# Patient Record
Sex: FEMALE | Race: WHITE | NOT HISPANIC OR LATINO | Employment: UNEMPLOYED | URBAN - METROPOLITAN AREA
[De-identification: names, ages, dates, MRNs, and addresses within clinical notes are randomized per-mention and may not be internally consistent; named-entity substitution may affect disease eponyms.]

---

## 2017-04-03 ENCOUNTER — APPOINTMENT (OUTPATIENT)
Dept: WOMENS IMAGING | Facility: HOSPITAL | Age: 42
End: 2017-04-03

## 2017-04-03 PROCEDURE — 77067 SCR MAMMO BI INCL CAD: CPT | Performed by: RADIOLOGY

## 2018-02-20 ENCOUNTER — OFFICE VISIT (OUTPATIENT)
Dept: GASTROENTEROLOGY | Facility: CLINIC | Age: 43
End: 2018-02-20

## 2018-02-20 ENCOUNTER — TELEPHONE (OUTPATIENT)
Dept: GASTROENTEROLOGY | Facility: CLINIC | Age: 43
End: 2018-02-20

## 2018-02-20 VITALS
TEMPERATURE: 98.6 F | SYSTOLIC BLOOD PRESSURE: 126 MMHG | HEIGHT: 65 IN | DIASTOLIC BLOOD PRESSURE: 72 MMHG | WEIGHT: 127 LBS | BODY MASS INDEX: 21.16 KG/M2

## 2018-02-20 DIAGNOSIS — Z83.71 FH: COLON POLYPS: ICD-10-CM

## 2018-02-20 DIAGNOSIS — R10.13 EPIGASTRIC PAIN: Primary | ICD-10-CM

## 2018-02-20 PROCEDURE — 99204 OFFICE O/P NEW MOD 45 MIN: CPT | Performed by: INTERNAL MEDICINE

## 2018-02-20 RX ORDER — NICOTINE POLACRILEX 2 MG
GUM BUCCAL
COMMUNITY
End: 2022-03-04

## 2018-02-20 RX ORDER — SUCRALFATE ORAL 1 G/10ML
1 SUSPENSION ORAL
Qty: 900 ML | Refills: 1 | Status: SHIPPED | OUTPATIENT
Start: 2018-02-20 | End: 2018-09-20 | Stop reason: ALTCHOICE

## 2018-02-20 NOTE — TELEPHONE ENCOUNTER
----- Message from China Jerome MD sent at 2/20/2018  3:21 PM EST -----  Regarding: REQUEST LABS  REQUEST LABS FROM DR OMKAR URBANO

## 2018-02-20 NOTE — PROGRESS NOTES
Chief Complaint   Patient presents with   • Abdominal Pain     loose stool   • esophageal discomfort     after eatting     Flora Cruz is a 42 y.o. female who presents with epigastric pain that began one year go.  Symptoms are intermittent.  She has seen a complementary physician and has been on a gluten free and dairy free diet 2-3 months.  She has been to the ER 3 times  One time she had developing pneumonia.  Pain hurts after she eats after she lays down at night - wakes her from sleep.  Worse if she lays onher stomach - worse mid cycle.  Type of food she eats doesn't matter.  Worse if she eats out.  ? Whether wine makes it worse.  raRely nauseated.  Lost a little weight since she started diet.  She feels numbness in her upper extremities at times and she has seen 2 neurologists. She feels fatigued.    Had ruq u/s at [facility] - has not gotten results,She has consistently loose stools for the most part - no blood in stool.  Occ constipated.  Her brother (2 years older) has had polyps - GP with CRC, mother with polyps.  HPI  History reviewed. No pertinent past medical history.  Past Surgical History:   Procedure Laterality Date   • LEEP  2008   • WISDOM TOOTH EXTRACTION         Current Outpatient Prescriptions:   •  Biotin 1 MG capsule, Take  by mouth., Disp: , Rfl:   •  Multiple Vitamins-Minerals (MULTIVITAMIN ADULT PO), Take  by mouth., Disp: , Rfl:   •  Omega-3 Fatty Acids (OMEGA 3 PO), Take  by mouth., Disp: , Rfl:   •  sucralfate (CARAFATE) 1 GM/10ML suspension, Take 10 mL by mouth 4 (Four) Times a Day Before Meals & at Bedtime As Needed (epigastric pain)., Disp: 900 mL, Rfl: 1  No Known Allergies  Social History     Social History   • Marital status: Unknown     Spouse name: N/A   • Number of children: N/A   • Years of education: N/A     Occupational History   • Not on file.     Social History Main Topics   • Smoking status: Never Smoker   • Smokeless tobacco: Never Used   • Alcohol use 0.6 oz/week     1  Glasses of wine per week      Comment: social   • Drug use: No   • Sexual activity: Not on file     Other Topics Concern   • Not on file     Social History Narrative   • No narrative on file     Family History   Problem Relation Age of Onset   • Diverticulitis Father    • Celiac disease Sister    • Crohn's disease Sister    • Colon cancer Maternal Grandmother    • Colon cancer Paternal Grandmother      Review of Systems   Constitutional: Positive for fatigue. Negative for appetite change and unexpected weight change.   Gastrointestinal: Positive for abdominal pain. Negative for abdominal distention, blood in stool, nausea and vomiting.   Neurological: Positive for numbness.   All other systems reviewed and are negative.    Vitals:    02/20/18 1459   BP: 126/72   Temp: 98.6 °F (37 °C)     Last Weight    02/20/18  1459   Weight: 57.6 kg (127 lb)     Physical Exam   Constitutional: She is oriented to person, place, and time. She appears well-developed and well-nourished.   HENT:   Head: Normocephalic and atraumatic.   Eyes: Conjunctivae are normal. No scleral icterus.   Neck: Normal range of motion. Neck supple.   Cardiovascular: Normal rate and regular rhythm.    Pulmonary/Chest: Effort normal and breath sounds normal.   Abdominal: Soft. Bowel sounds are normal. She exhibits no distension. There is tenderness.       Musculoskeletal: She exhibits no edema.   Neurological: She is alert and oriented to person, place, and time.   Skin: Skin is warm and dry.   Psychiatric: She has a normal mood and affect.   Nursing note and vitals reviewed.    No images are attached to the encounter.  No notes on file  Flora was seen today for abdominal pain and esophageal discomfort.    Diagnoses and all orders for this visit:    Epigastric pain  -     Case Request; Standing  -     Case Request    FH: colon polyps  -     Case Request; Standing  -     Case Request    Other orders  -     sucralfate (CARAFATE) 1 GM/10ML suspension; Take 10  mL by mouth 4 (Four) Times a Day Before Meals & at Bedtime As Needed (epigastric pain).  -     Implement Anesthesia orders day of procedure.; Standing  -     Obtain informed consent; Standing  -     Verify bowel prep was successful; Standing  -     Give tap water enema if bowel prep was insufficient; Standing    Plan:  - request labs and u/s  - trial carafate  - schedule egd and colonoscopy for further evaluation

## 2018-02-20 NOTE — TELEPHONE ENCOUNTER
Call to Dr Bradley Soto's office @ [phone] and spoke with Hien.  Request recent labs be faxed to [phone].      Call to [facility] @ [phone] and spoke with Darling.  Request US results be faxed to [phone].     Call to Dr Ana Brown's office @ [phone].  VM left for Isabell requesting recent labs be faxed to [phone].

## 2018-02-20 NOTE — TELEPHONE ENCOUNTER
----- Message from China Jerome MD sent at 2/20/2018  3:19 PM EST -----  Regarding: request u/s and labs  U/s from [facility] from dr zimmerman

## 2018-02-26 ENCOUNTER — TELEPHONE (OUTPATIENT)
Dept: GASTROENTEROLOGY | Facility: CLINIC | Age: 43
End: 2018-02-26

## 2018-02-26 NOTE — TELEPHONE ENCOUNTER
"Call to pt.  States forgot to tell Dr Jerome that has \"pretty severe\" diastasis recti.  Asking if this would have any impact on EGD.    Advise pt Dr Jerome out of office until tomorrow - will send message.  Pt verb understanding.  "

## 2018-02-26 NOTE — TELEPHONE ENCOUNTER
----- Message from Guy Montoya sent at 2/26/2018  9:46 AM EST -----  Regarding: MEDICAL QUESTIONS   Contact: [phone]  PT CALLED WITH MEDICAL QUESTIONS DUE TO BEEN IN THE OFFICE LAST WEEKS

## 2018-09-14 NOTE — PROGRESS NOTES
Date of Office Visit: 2018  Encounter Provider: John Collins MD  Place of Service: [facility] CARD[facility]  Patient Name: Flora Cruz  :1975    Chief Complaint   Patient presents with   • Palpitations   • Loss of Consciousness   :     HPI: Flora Cruz is a 42 y.o. female who presents today in consultation for palpitations at the request of Dr. Soto.  She has had a tough go of it over the last 1.5 years.  She had fatigue, dyspnea, and chest pain and was ultimately diagnosed with pneumonia. She was treated with antibiotics but never really felt better.  She did have a stress echo at [facility] which was good quality and completely normal.  She also wore a Holter which showed a total of 19 ectopics.      She continues to have a lot of issues, and has been diagnosed with mold toxicity and systemic inflammation.  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  She has had brain MRIs and a battery of lab tests.      She has had palpitations since she was quite young.  They occur more at rest and are more noticeable on the left side.  It feels like a skip or flutter or hard beat.  It doesn't race or beat irregularly.  It has improved since starting a multivitamin.  She has not passed out.  She hasn't had exertional chest pain either but does sometimes have mid/lower sternal pain.    Past Medical History:   Diagnosis Date   • History of panic attacks        Past Surgical History:   Procedure Laterality Date   • LEEKEESHA     • WISDOM TOOTH EXTRACTION         Social History     Social History   • Marital status:      Spouse name: N/A   • Number of children: N/A   • Years of education: N/A     Occupational History   • Not on file.     Social History Main Topics   • Smoking status: Never Smoker   • Smokeless tobacco: Never Used   • Alcohol use 0.6 oz/week     1 Glasses of wine per week      Comment: social   • Drug use: No   • Sexual activity: Yes     Other Topics  "Concern   • Not on file     Social History Narrative   • No narrative on file       Family History   Problem Relation Age of Onset   • Diverticulitis Father    • Celiac disease Sister    • Crohn's disease Sister    • Colon cancer Maternal Grandmother    • Colon cancer Paternal Grandmother    • Heart disease Mother        Review of Systems   Constitution: Positive for malaise/fatigue.   Cardiovascular: Positive for dyspnea on exertion.   Endocrine: Positive for cold intolerance.   Gastrointestinal: Positive for abdominal pain and change in bowel habit.   Neurological: Positive for excessive daytime sleepiness, headaches, numbness and paresthesias.   Psychiatric/Behavioral: The patient is nervous/anxious.    All other systems reviewed and are negative.      No Known Allergies      Current Outpatient Prescriptions:   •  Alpha-Lipoic Acid 100 MG capsule, Take  by mouth 2 (Two) Times a Day., Disp: , Rfl:   •  Biotin 1 MG capsule, Take  by mouth., Disp: , Rfl:   •  Multiple Vitamins-Minerals (MULTIVITAMIN ADULT PO), Take  by mouth., Disp: , Rfl:   •  Omega-3 Fatty Acids (OMEGA 3 PO), Take  by mouth., Disp: , Rfl:   •  vinCRIStine liposome (MARQIBO) 5 MG/31ML chemo injection, Infuse  into a venous catheter., Disp: , Rfl:       Objective:     Vitals:    09/20/18 1131 09/20/18 1141   BP: 118/76 116/74   BP Location: Left arm Right arm   Patient Position: Sitting Sitting   Pulse: 72    Weight: 51.3 kg (113 lb)    Height: 165.1 cm (65\")      Body mass index is 18.8 kg/m².    Physical Exam   Constitutional: She is oriented to person, place, and time. She appears well-developed and well-nourished.   HENT:   Head: Normocephalic.   Nose: Nose normal.   Mouth/Throat: Oropharynx is clear and moist.   Eyes: Pupils are equal, round, and reactive to light. Conjunctivae and EOM are normal.   Neck: Normal range of motion. No JVD present.   Cardiovascular: Normal rate, regular rhythm, normal heart sounds and intact distal pulses.    No " murmur heard.  Pulmonary/Chest: Effort normal and breath sounds normal.   Abdominal: Soft. She exhibits no mass. There is no tenderness.   Musculoskeletal: Normal range of motion. She exhibits no edema.   Lymphadenopathy:     She has no cervical adenopathy.   Neurological: She is alert and oriented to person, place, and time. No cranial nerve deficit.   Skin: Skin is warm and dry. No rash noted.   Psychiatric: She has a normal mood and affect. Her behavior is normal. Judgment and thought content normal.   Vitals reviewed.        ECG 12 Lead  Date/Time: 9/20/2018 4:01 PM  Performed by: JOHN COLLINS  Authorized by: JOHN COLLINS   Comparison: compared with previous ECG   Similar to previous ECG  Rhythm: sinus rhythm  Conduction: conduction normal  ST Segments: ST segments normal  T Waves: T waves normal  QRS axis: normal  Other: no other findings  Clinical impression: normal ECG              Assessment:       Diagnosis Plan   1. Palpitations     2. Precordial pain            Plan:       1.  These sound like benign ectopics, and a previous Holter showed rare PVCs/PACs.  Her heart is structurally normal.  I recommend observation for these.  I did tell her that empirically, OTC magnesium supplementation does help some people, and it seems that her MVI is improving things already.    2.  I suspect that this is costochondritis.  She had a good quality BEBO last year which was normal.     Sincerely,       John Collins MD

## 2018-09-18 ENCOUNTER — TELEPHONE (OUTPATIENT)
Dept: CARDIOLOGY | Facility: CLINIC | Age: 43
End: 2018-09-18

## 2018-09-18 NOTE — TELEPHONE ENCOUNTER
Pt returned your call and said that she has seen a cardiologist, Dr. Wang from [facility] and he did do some tests on her. Looks like there are some in Care Everywhere. Her c/b is [phone].

## 2018-09-20 ENCOUNTER — OFFICE VISIT (OUTPATIENT)
Dept: CARDIOLOGY | Facility: CLINIC | Age: 43
End: 2018-09-20

## 2018-09-20 VITALS
WEIGHT: 113 LBS | HEIGHT: 65 IN | SYSTOLIC BLOOD PRESSURE: 116 MMHG | DIASTOLIC BLOOD PRESSURE: 74 MMHG | HEART RATE: 72 BPM | BODY MASS INDEX: 18.83 KG/M2

## 2018-09-20 DIAGNOSIS — R00.2 PALPITATIONS: Primary | ICD-10-CM

## 2018-09-20 DIAGNOSIS — R07.2 PRECORDIAL PAIN: ICD-10-CM

## 2018-09-20 PROCEDURE — 93000 ELECTROCARDIOGRAM COMPLETE: CPT | Performed by: INTERNAL MEDICINE

## 2018-09-20 PROCEDURE — 99244 OFF/OP CNSLTJ NEW/EST MOD 40: CPT | Performed by: INTERNAL MEDICINE

## 2018-11-13 ENCOUNTER — TELEPHONE (OUTPATIENT)
Dept: GASTROENTEROLOGY | Facility: CLINIC | Age: 43
End: 2018-11-13

## 2018-11-13 NOTE — TELEPHONE ENCOUNTER
Called pt and pt reports that she is ready to have egd and c/s.  She states she is having abd pain and breathing issues.  She states the allergist Dr Ibarra states her lungs were fine but he thought she needed to go ahead with scopes.  Pt reports that her diastasis recti is worse. Advised would send message to Dr Jerome. Pt verb understanding.

## 2018-11-13 NOTE — TELEPHONE ENCOUNTER
----- Message from Guy Montoya sent at 11/13/2018 11:33 AM EST -----  Regarding: c/s case order   Contact: [phone]  Need new case order

## 2018-11-14 ENCOUNTER — PREP FOR SURGERY (OUTPATIENT)
Dept: OTHER | Facility: HOSPITAL | Age: 43
End: 2018-11-14

## 2018-11-14 DIAGNOSIS — Z83.71 FH: COLON POLYPS: ICD-10-CM

## 2018-11-14 DIAGNOSIS — R10.13 EPIGASTRIC PAIN: Primary | ICD-10-CM

## 2018-11-20 ENCOUNTER — TELEPHONE (OUTPATIENT)
Dept: URGENT CARE | Facility: CLINIC | Age: 43
End: 2018-11-20

## 2019-01-02 ENCOUNTER — OFFICE VISIT (OUTPATIENT)
Dept: GASTROENTEROLOGY | Facility: CLINIC | Age: 44
End: 2019-01-02

## 2019-01-02 ENCOUNTER — TELEPHONE (OUTPATIENT)
Dept: GASTROENTEROLOGY | Facility: CLINIC | Age: 44
End: 2019-01-02

## 2019-01-02 VITALS
BODY MASS INDEX: 20.12 KG/M2 | DIASTOLIC BLOOD PRESSURE: 62 MMHG | WEIGHT: 120.8 LBS | SYSTOLIC BLOOD PRESSURE: 108 MMHG | HEIGHT: 65 IN | TEMPERATURE: 97.7 F

## 2019-01-02 DIAGNOSIS — M62.08 DIASTASIS OF RECTUS ABDOMINIS: ICD-10-CM

## 2019-01-02 DIAGNOSIS — R10.13 DYSPEPSIA: Primary | ICD-10-CM

## 2019-01-02 DIAGNOSIS — K42.9 UMBILICAL HERNIA WITHOUT OBSTRUCTION AND WITHOUT GANGRENE: ICD-10-CM

## 2019-01-02 DIAGNOSIS — R14.0 ABDOMINAL DISTENSION: ICD-10-CM

## 2019-01-02 PROCEDURE — 99214 OFFICE O/P EST MOD 30 MIN: CPT | Performed by: INTERNAL MEDICINE

## 2019-01-02 NOTE — TELEPHONE ENCOUNTER
request path from [facility] from recent egd/c/s   Received: Today   Message Contents   China Jerome MD  [facility] Clinical 1 Pool     Called [facility] at [phone] attemped x2.  Advised  not available and call dropped. Will attempt later.

## 2019-01-02 NOTE — PROGRESS NOTES
Chief Complaint   Patient presents with   • upper gastric pain and pressure       Flora Cruz is a  43 y.o. female here for a follow up visit for abdominal pain and bloating.  She reports a good source of the day goes on.  Since her last visit she underwent an EGD and colonoscopy with another provider.  She did have 3 polyps removed-1 was a tubulovillous adenoma.  She had intraepithelial lymphocytosis in the small intestine.  This is per the patient.    Unavailable.  She was recommended to try a PPI which she has not done.  HIDA scan was recommended but they did not have CCK so was not performed.  She has resumed eating dairy and gluten.  She lost significant weight on this diet.  She did have a negative celiac blood test while she was eating gluten.  She is feeling better overall but notes that her abdomen gets more distended to sedate goes on.  She sees a physical protuberance in her upper abdomen near her belly button which is painful.  Bowel movements are normal.  No blood in her stool.  HPI  Past Medical History:   Diagnosis Date   • History of panic attacks      Past Surgical History:   Procedure Laterality Date   • CERVICAL BIOPSY  W/ LOOP ELECTRODE EXCISION     • COLONOSCOPY  11/27/2018    S[facility]ale M.D.   • LEEP  2008   • UPPER GASTROINTESTINAL ENDOSCOPY  11/27/2018    possible celiac - [facility]    • WISDOM TOOTH EXTRACTION         Current Outpatient Medications:   •  Multiple Vitamins-Minerals (MULTIVITAMIN ADULT PO), Take  by mouth., Disp: , Rfl:   •  Omega-3 Fatty Acids (OMEGA 3 PO), Take  by mouth., Disp: , Rfl:   •  Probiotic Product (PROBIOTIC PO), Take  by mouth., Disp: , Rfl:   •  Alpha-Lipoic Acid 100 MG capsule, Take  by mouth 2 (Two) Times a Day., Disp: , Rfl:   •  Biotin 1 MG capsule, Take  by mouth., Disp: , Rfl:   •  vinCRIStine liposome (MARQIBO) 5 MG/31ML chemo injection, Infuse  into a venous catheter., Disp: , Rfl:   PRN Meds:.  No Known Allergies  Social History      Socioeconomic History   • Marital status:      Spouse name: Not on file   • Number of children: Not on file   • Years of education: Not on file   • Highest education level: Not on file   Social Needs   • Financial resource strain: Not on file   • Food insecurity - worry: Not on file   • Food insecurity - inability: Not on file   • Transportation needs - medical: Not on file   • Transportation needs - non-medical: Not on file   Occupational History   • Not on file   Tobacco Use   • Smoking status: Never Smoker   • Smokeless tobacco: Never Used   Substance and Sexual Activity   • Alcohol use: Yes     Alcohol/week: 0.6 oz     Types: 1 Glasses of wine per week     Comment: social   • Drug use: No   • Sexual activity: Yes   Other Topics Concern   • Not on file   Social History Narrative   • Not on file     Family History   Problem Relation Age of Onset   • Diverticulitis Father    • Colon polyps Father    • Celiac disease Sister    • Crohn's disease Sister    • Colon polyps Sister    • Colon cancer Maternal Grandmother    • Colon cancer Paternal Grandmother    • Cirrhosis Paternal Grandmother    • Heart disease Mother    • Colon polyps Mother    • Colon polyps Brother      Review of Systems   Constitutional: Negative for appetite change and unexpected weight change.   Gastrointestinal: Positive for abdominal distention and abdominal pain. Negative for blood in stool, nausea and vomiting.   All other systems reviewed and are negative.    Vitals:    01/02/19 1415   BP: 108/62   Temp: 97.7 °F (36.5 °C)         01/02/19  1415   Weight: 54.8 kg (120 lb 12.8 oz)     Physical Exam   Constitutional: She appears well-developed and well-nourished.   HENT:   Head: Normocephalic and atraumatic.   Eyes: No scleral icterus.   Pulmonary/Chest: Effort normal.   Abdominal: Soft. She exhibits no distension and no mass. There is no tenderness.   Small umbilical hernia, diastasis rectus   Neurological: She is alert.   Skin: Skin  is warm and dry.   Psychiatric: She has a normal mood and affect.     No images are attached to the encounter.  Diagnoses and all orders for this visit:    Dyspepsia    Umbilical hernia without obstruction and without gangrene    Abdominal distension    Diastasis of rectus abdominis    Other orders  -     Probiotic Product (PROBIOTIC PO); Take  by mouth.         Plan:  - trial ppi  - check for sibo  - try FDgard/IBgard  - Request path from [facility]  - 8 week f/u

## 2019-01-04 NOTE — TELEPHONE ENCOUNTER
Called [facility] at [phone] and was on hold for 10 min.  Will try again later.

## 2019-01-04 NOTE — TELEPHONE ENCOUNTER
Called [facility] medical records at [phone] and spoke with Monse and requested pt's recent egd and c/s and path report be faxed to [phone].

## 2019-01-07 NOTE — TELEPHONE ENCOUNTER
Records received from [facility] and scanned under media tab.  Dr Jerome notified.

## 2019-01-25 ENCOUNTER — TELEPHONE (OUTPATIENT)
Dept: CARDIOLOGY | Facility: CLINIC | Age: 44
End: 2019-01-25

## 2019-01-25 NOTE — TELEPHONE ENCOUNTER
01/25/19  4:28 PM  Flora Cruz  1975    Home Phone [phone]   Mobile [phone]       Flora Cruz is a patient of Dr fallon.  She is calling in with c/o constant chest tenderness for the last 4-5 days.  She says it is tender to touch.  When she lays down she has left arm numbness from the shoulder down.  She describes a burning in the chest.  She has not tried an antiacid, as GI had asked her to do.  She says she is chilling as well, but not running a temp.      She has no other symptoms.  I suggested she call her PCP. She doesn't have a PCP, gave her the number of appt liaison.  She is agreeable to call.    Patient instructed to go to er if symptoms worsen.    Does she need to come in and be seen?    Belia Diaz RN  Triage nurse

## 2019-01-28 NOTE — TELEPHONE ENCOUNTER
Just based on your description, this sounds non-cardiac.  Please check on her today and if she still has symptoms she should go to  to exclude influenza.    abiola

## 2019-01-28 NOTE — TELEPHONE ENCOUNTER
Patient notified. Still having numbness.  Suggested she go to urgent care.  She is agreeable.  She is still trying to find a PCP  Belia Diaz RN  Triage nurse

## 2020-12-03 ENCOUNTER — TRANSCRIBE ORDERS (OUTPATIENT)
Dept: NEUROLOGY | Facility: CLINIC | Age: 45
End: 2020-12-03

## 2020-12-03 DIAGNOSIS — G47.10 HYPERSOMNIA: Primary | ICD-10-CM

## 2020-12-03 DIAGNOSIS — R29.818 SUSPECTED SLEEP APNEA: ICD-10-CM

## 2020-12-03 DIAGNOSIS — R06.83 SNORING: ICD-10-CM

## 2020-12-03 DIAGNOSIS — G47.00 INSOMNIA, UNSPECIFIED TYPE: ICD-10-CM

## 2020-12-13 ENCOUNTER — APPOINTMENT (OUTPATIENT)
Dept: SLEEP MEDICINE | Facility: HOSPITAL | Age: 45
End: 2020-12-13

## 2021-05-04 ENCOUNTER — APPOINTMENT (OUTPATIENT)
Dept: WOMENS IMAGING | Facility: HOSPITAL | Age: 46
End: 2021-05-04

## 2021-05-04 PROCEDURE — 77063 BREAST TOMOSYNTHESIS BI: CPT | Performed by: RADIOLOGY

## 2021-05-04 PROCEDURE — 77067 SCR MAMMO BI INCL CAD: CPT | Performed by: RADIOLOGY

## 2022-01-05 ENCOUNTER — PREP FOR SURGERY (OUTPATIENT)
Dept: SURGERY | Facility: SURGERY CENTER | Age: 47
End: 2022-01-05

## 2022-01-05 ENCOUNTER — OFFICE VISIT (OUTPATIENT)
Dept: GASTROENTEROLOGY | Facility: CLINIC | Age: 47
End: 2022-01-05

## 2022-01-05 VITALS
TEMPERATURE: 97.5 F | HEIGHT: 65 IN | DIASTOLIC BLOOD PRESSURE: 70 MMHG | OXYGEN SATURATION: 99 % | BODY MASS INDEX: 23.82 KG/M2 | HEART RATE: 81 BPM | WEIGHT: 143 LBS | SYSTOLIC BLOOD PRESSURE: 114 MMHG

## 2022-01-05 DIAGNOSIS — Z86.010 HISTORY OF COLON POLYPS: Primary | ICD-10-CM

## 2022-01-05 DIAGNOSIS — Z12.11 ENCOUNTER FOR SCREENING FOR MALIGNANT NEOPLASM OF COLON: ICD-10-CM

## 2022-01-05 DIAGNOSIS — K43.9 VENTRAL HERNIA WITHOUT OBSTRUCTION OR GANGRENE: ICD-10-CM

## 2022-01-05 PROBLEM — Z86.0100 HISTORY OF COLON POLYPS: Status: ACTIVE | Noted: 2022-01-05

## 2022-01-05 PROCEDURE — 99204 OFFICE O/P NEW MOD 45 MIN: CPT | Performed by: NURSE PRACTITIONER

## 2022-01-05 RX ORDER — SODIUM CHLORIDE, SODIUM LACTATE, POTASSIUM CHLORIDE, CALCIUM CHLORIDE 600; 310; 30; 20 MG/100ML; MG/100ML; MG/100ML; MG/100ML
30 INJECTION, SOLUTION INTRAVENOUS CONTINUOUS PRN
Status: CANCELLED | OUTPATIENT
Start: 2022-01-05

## 2022-01-05 RX ORDER — FLUOXETINE 10 MG/1
30 CAPSULE ORAL DAILY
COMMUNITY
Start: 2021-10-26

## 2022-01-05 RX ORDER — SODIUM CHLORIDE 0.9 % (FLUSH) 0.9 %
10 SYRINGE (ML) INJECTION AS NEEDED
Status: CANCELLED | OUTPATIENT
Start: 2022-01-05

## 2022-01-05 RX ORDER — SODIUM CHLORIDE 0.9 % (FLUSH) 0.9 %
3 SYRINGE (ML) INJECTION EVERY 12 HOURS SCHEDULED
Status: CANCELLED | OUTPATIENT
Start: 2022-01-05

## 2022-03-04 NOTE — SIGNIFICANT NOTE
Education provided the Patient on the following:    - Nothing to Eat or Drink after MN the night before the procedure    - Avoid red/purple fluids while completing their bowel prep as ordered by physician  -Contact Gastrointerologist office for any questions about specific details regarding colon prep    -You will need to have someone drive you home after your colonoscopy and remain with you for 24 hours after the procedure  - The date of your Surgery, you may have one visitor at bedside or within 10-15 minutes of Jehovah's witness Jerome  -Please wear warm socks when you arrive for your colonoscopy  -Remove all jewelry and leave any valuables before arriving the day of your procedure (all will have to be removed before leaving preop)  -You will need to arrive at 8:30 on 3/8 for your colonoscopy    -Feel free to contact us at: [phone] with any additional questions/concerns

## 2022-03-08 ENCOUNTER — HOSPITAL ENCOUNTER (OUTPATIENT)
Facility: SURGERY CENTER | Age: 47
Setting detail: HOSPITAL OUTPATIENT SURGERY
Discharge: HOME | End: 2022-03-08
Attending: INTERNAL MEDICINE | Admitting: INTERNAL MEDICINE

## 2022-03-08 ENCOUNTER — ANESTHESIA EVENT (OUTPATIENT)
Dept: SURGERY | Facility: SURGERY CENTER | Age: 47
End: 2022-03-08

## 2022-03-08 ENCOUNTER — ANESTHESIA (OUTPATIENT)
Dept: SURGERY | Facility: SURGERY CENTER | Age: 47
End: 2022-03-08

## 2022-03-08 VITALS
RESPIRATION RATE: 16 BRPM | HEART RATE: 66 BPM | DIASTOLIC BLOOD PRESSURE: 88 MMHG | HEIGHT: 65 IN | OXYGEN SATURATION: 100 % | BODY MASS INDEX: 23.82 KG/M2 | SYSTOLIC BLOOD PRESSURE: 123 MMHG | TEMPERATURE: 97.6 F | WEIGHT: 143 LBS

## 2022-03-08 DIAGNOSIS — Z86.010 HISTORY OF COLON POLYPS: ICD-10-CM

## 2022-03-08 DIAGNOSIS — Z12.11 ENCOUNTER FOR SCREENING FOR MALIGNANT NEOPLASM OF COLON: ICD-10-CM

## 2022-03-08 LAB
B-HCG UR QL: NEGATIVE
EXPIRATION DATE: ABNORMAL
INTERNAL NEGATIVE CONTROL: ABNORMAL
INTERNAL POSITIVE CONTROL: ABNORMAL
Lab: ABNORMAL

## 2022-03-08 PROCEDURE — 81025 URINE PREGNANCY TEST: CPT | Performed by: INTERNAL MEDICINE

## 2022-03-08 PROCEDURE — 45385 COLONOSCOPY W/LESION REMOVAL: CPT | Performed by: INTERNAL MEDICINE

## 2022-03-08 PROCEDURE — 25010000002 PROPOFOL 10 MG/ML EMULSION: Performed by: ANESTHESIOLOGY

## 2022-03-08 PROCEDURE — 88305 TISSUE EXAM BY PATHOLOGIST: CPT | Performed by: INTERNAL MEDICINE

## 2022-03-08 RX ORDER — FERROUS SULFATE 325(65) MG
325 TABLET ORAL
COMMUNITY

## 2022-03-08 RX ORDER — MAGNESIUM HYDROXIDE 1200 MG/15ML
LIQUID ORAL AS NEEDED
Status: DISCONTINUED | OUTPATIENT
Start: 2022-03-08 | End: 2022-03-08 | Stop reason: HOSPADM

## 2022-03-08 RX ORDER — SODIUM CHLORIDE, SODIUM LACTATE, POTASSIUM CHLORIDE, CALCIUM CHLORIDE 600; 310; 30; 20 MG/100ML; MG/100ML; MG/100ML; MG/100ML
30 INJECTION, SOLUTION INTRAVENOUS CONTINUOUS PRN
Status: DISCONTINUED | OUTPATIENT
Start: 2022-03-08 | End: 2022-03-08 | Stop reason: HOSPADM

## 2022-03-08 RX ORDER — SODIUM CHLORIDE 0.9 % (FLUSH) 0.9 %
3 SYRINGE (ML) INJECTION EVERY 12 HOURS SCHEDULED
Status: DISCONTINUED | OUTPATIENT
Start: 2022-03-08 | End: 2022-03-08 | Stop reason: HOSPADM

## 2022-03-08 RX ORDER — SODIUM CHLORIDE 0.9 % (FLUSH) 0.9 %
10 SYRINGE (ML) INJECTION AS NEEDED
Status: DISCONTINUED | OUTPATIENT
Start: 2022-03-08 | End: 2022-03-08 | Stop reason: HOSPADM

## 2022-03-08 RX ORDER — PROPOFOL 10 MG/ML
VIAL (ML) INTRAVENOUS AS NEEDED
Status: DISCONTINUED | OUTPATIENT
Start: 2022-03-08 | End: 2022-03-08 | Stop reason: SURG

## 2022-03-08 RX ORDER — SODIUM CHLORIDE, SODIUM LACTATE, POTASSIUM CHLORIDE, CALCIUM CHLORIDE 600; 310; 30; 20 MG/100ML; MG/100ML; MG/100ML; MG/100ML
INJECTION, SOLUTION INTRAVENOUS CONTINUOUS PRN
Status: DISCONTINUED | OUTPATIENT
Start: 2022-03-08 | End: 2022-03-08 | Stop reason: SURG

## 2022-03-08 RX ORDER — LIDOCAINE HYDROCHLORIDE 20 MG/ML
INJECTION, SOLUTION INFILTRATION; PERINEURAL AS NEEDED
Status: DISCONTINUED | OUTPATIENT
Start: 2022-03-08 | End: 2022-03-08 | Stop reason: SURG

## 2022-03-08 RX ADMIN — SODIUM CHLORIDE, POTASSIUM CHLORIDE, SODIUM LACTATE AND CALCIUM CHLORIDE 30 ML/HR: 600; 310; 30; 20 INJECTION, SOLUTION INTRAVENOUS at 09:16

## 2022-03-08 RX ADMIN — PROPOFOL 50 MG: 10 INJECTION, EMULSION INTRAVENOUS at 09:37

## 2022-03-08 RX ADMIN — PROPOFOL 120 MG: 10 INJECTION, EMULSION INTRAVENOUS at 09:31

## 2022-03-08 RX ADMIN — SODIUM CHLORIDE, SODIUM LACTATE, POTASSIUM CHLORIDE, AND CALCIUM CHLORIDE: .6; .31; .03; .02 INJECTION, SOLUTION INTRAVENOUS at 09:27

## 2022-03-08 RX ADMIN — LIDOCAINE HYDROCHLORIDE 60 MG: 20 INJECTION, SOLUTION INFILTRATION; PERINEURAL at 09:28

## 2022-03-08 RX ADMIN — PROPOFOL 250 MCG/KG/MIN: 10 INJECTION, EMULSION INTRAVENOUS at 09:31

## 2022-03-08 NOTE — EXTERNAL PATIENT INSTRUCTIONS
Patient Education   Table of Contents       Diverticulosis     To view videos and all your education online visit,   [email or web address]   or scan this QR code with your smartphone.                  Diverticulosis        Diverticulosis is a condition that develops when small pouches (diverticula) form in the wall of the large intestine (colon). The colon is where water is absorbed and stool (feces) is formed. The pouches form when the inside layer of the colon pushes through weak spots in the outer layers of the colon. You may have a few pouches or many of them.   The pouches usually do not cause problems unless they become inflamed or infected. When this happens, the condition is called diverticulitis.     What are the causes?   The cause of this condition is not known.   What increases the risk?    The following factors may make you more likely to develop this condition:       Being older than age 60. Your risk for this condition increases with age. Diverticulosis is rare among people younger than age 30. By age 80, many people have it.       Eating a low-fiber diet.       Having frequent constipation.       Being overweight.       Not getting enough exercise.       Smoking.       Taking over-the-counter pain medicines, like aspirin and ibuprofen.       Having a family history of diverticulosis.     What are the signs or symptoms?    In most people, there are no symptoms of this condition. If you do have symptoms, they may include:       Bloating.       Cramps in the abdomen.       Constipation or diarrhea.       Pain in the lower left side of the abdomen.     How is this diagnosed?    Because diverticulosis usually has no symptoms, it is most often diagnosed during an exam for other colon problems. The condition may be diagnosed by:       Using a flexible scope to examine the colon (colonoscopy).       Taking an X-ray of the colon after dye has been put into the colon (barium enema).       Having a CT  scan.     How is this treated?       You may not need treatment for this condition. Your health care provider may recommend treatment to prevent problems. You may need treatment if you have symptoms or if you previously had diverticulitis. Treatment may include:       Eating a high-fiber diet.       Taking a fiber supplement.       Taking a live bacteria supplement (probiotic).       Taking medicine to relax your colon.       Follow these instructions at home:   Medicines         Take over-the-counter and prescription medicines only as told by your health care provider.       If told by your health care provider, take a fiber supplement or probiotic.     Constipation prevention       Your condition may cause constipation. To prevent or treat constipation, you may need to:       Drink enough fluid to keep your urine pale yellow.       Take over-the-counter or prescription medicines.       Eat foods that are high in fiber, such as beans, whole grains, and fresh fruits and vegetables.       Limit foods that are high in fat and processed sugars, such as fried or sweet foods.     General instructions         Try not to strain when you have a bowel movement.       Keep all follow-up visits as told by your health care provider. This is important.       Contact a health care provider if you:         Have pain in your abdomen.       Have bloating.       Have cramps.       Have not had a bowel movement in 3 days.     Get help right away if:         Your pain gets worse.       Your bloating becomes very bad.       You have a fever or chills, and your symptoms suddenly get worse.       You vomit.       You have bowel movements that are bloody or black.       You have bleeding from your rectum.     Summary         Diverticulosis is a condition that develops when small pouches (diverticula) form in the wall of the large intestine (colon).       You may have a few pouches or many of them.       This condition is most often  diagnosed during an exam for other colon problems.       Treatment may include increasing the fiber in your diet, taking supplements, or taking medicines.     This information is not intended to replace advice given to you by your health care provider. Make sure you discuss any questions you have with your health care provider.     Document Released: 09/14/2005Document Revised: 07/16/2020Document Reviewed: 07/16/2020     [employer] Patient Education ? 2022 [employer]

## 2022-03-08 NOTE — DISCHARGE INSTRUCTIONS
Monitored Anesthesia Care, Care After  These instructions provide you with information about caring for yourself after your procedure. Your health care provider may also give you more specific instructions. Your treatment has been planned according to current medical practices, but problems sometimes occur. Call your health care provider if you have any problems or questions after your procedure.  What can I expect after the procedure?  After your procedure, you may:  Feel sleepy for several hours.  Feel clumsy and have poor balance for several hours.  Feel forgetful about what happened after the procedure.  Have poor judgment for several hours.  Feel nauseous or vomit.  Have a sore throat if you had a breathing tube during the procedure.  Follow these instructions at home:    *****************NO DRIVING TODAY****************************    For at least 24 hours after the procedure:             Have a responsible adult stay with you. It is important to have someone help care for you until you are awake and alert.  Rest as needed.  Do not:  Participate in activities in which you could fall or become injured.  Use heavy machinery.  Drink alcohol.  Take sleeping pills or medicines that cause drowsiness.  Make important decisions or sign legal documents.  Take care of children on your own.  Eating and drinking  Follow the diet that is recommended by your health care provider.  If you vomit, drink water, juice, or soup when you can drink without vomiting.  Make sure you have little or no nausea before eating solid foods.  General instructions  Take over-the-counter and prescription medicines only as told by your health care provider.  If you have sleep apnea, surgery and certain medicines can increase your risk for breathing problems. Follow instructions from your health care provider about wearing your sleep device:  Anytime you are sleeping, including during daytime naps.  While taking prescription pain medicines,  sleeping medicines, or medicines that make you drowsy.  If you smoke, do not smoke without supervision.  Keep all follow-up visits as told by your health care provider. This is important.  Contact a health care provider if:  You keep feeling nauseous or you keep vomiting.  You feel light-headed.  You develop a rash.  You have a fever.  Get help right away if:  You have trouble breathing.  Summary  For several hours after your procedure, you may feel sleepy and have poor judgment.  Have a responsible adult stay with you for at least 24 hours or until you are awake and alert.  This information is not intended to replace advice given to you by your health care provider. Make sure you discuss any questions you have with your health care provider.  Document Released: 04/09/2017 Document Revised: 08/03/2018 Document Reviewed: 04/09/2017  [employer] Interactive Patient Education © 2019 [employer]    Colonoscopy, Adult, Care After  This sheet gives you information about how to care for yourself after your procedure. Your doctor may also give you more specific instructions. If you have problems or questions, call your doctor.  What can I expect after the procedure?  After the procedure, it is common to have:  A small amount of blood in your poop for 24 hours.  Some gas.  Mild cramping or bloating in your belly.  Follow these instructions at home:  General instructions    ***************DO NOT DRIVE TODAY*******************    For the first 24 hours after the procedure:  Do not sign important documents.  Do not drink alcohol.  Do your daily activities more slowly than normal.  Eat foods that are soft and easy to digest.  Take over-the-counter or prescription medicines only as told by your doctor.  To help cramping and bloating:       Try walking around.  Put heat on your belly (abdomen) as told by your doctor. Use a heat source that your doctor recommends, such as a moist heat pack or a heating pad.  Put a towel between your  skin and the heat source.  Leave the heat on for 20-30 minutes.  Remove the heat if your skin turns bright red. This is especially important if you cannot feel pain, heat, or cold. You can get burned.  Eating and drinking

## 2022-03-08 NOTE — ANESTHESIA POSTPROCEDURE EVALUATION
"Patient: Flora Cruz    Procedure Summary     Date: 03/08/22 Room / Location: [facility]    Anesthesia Start: 0927 Anesthesia Stop: 0953    Procedure: COLONOSCOPY with Polypectomy (N/A ) Diagnosis:       History of colon polyps      Encounter for screening for malignant neoplasm of colon      (History of colon polyps [Z86.010])      (Encounter for screening for malignant neoplasm of colon [Z12.11])    Surgeons: Leandro Akbar MD Provider: China Majano MD    Anesthesia Type: MAC ASA Status: 2          Anesthesia Type: MAC    Vitals  Vitals Value Taken Time   /79 03/08/22 1009   Temp 36.4 °C (97.6 °F) 03/08/22 0956   Pulse 70 03/08/22 1010   Resp 16 03/08/22 1006   SpO2 100 % 03/08/22 1010   Vitals shown include unvalidated device data.        Post Anesthesia Care and Evaluation    Patient location during evaluation: bedside  Patient participation: complete - patient participated  Level of consciousness: awake  Pain management: adequate  Airway patency: patent  Anesthetic complications: No anesthetic complications    Cardiovascular status: acceptable  Respiratory status: acceptable  Hydration status: acceptable    Comments: /86   Pulse 76   Temp 36.4 °C (97.6 °F) (Temporal)   Resp 16   Ht 165.1 cm (65\")   Wt 64.9 kg (143 lb)   LMP 02/15/2022 (Exact Date)   SpO2 99%   BMI 23.80 kg/m²       "

## 2022-03-08 NOTE — H&P
No chief complaint on file.      HPI  Patient today for screening colonoscopy.  She has history of colon polyps.         Problem List:    Patient Active Problem List   Diagnosis   • History of colon polyps   • Encounter for screening for malignant neoplasm of colon       Medical History:    Past Medical History:   Diagnosis Date   • Chest pain    • Dysphagia    • Epigastric pain    • History of panic attacks    • PONV (postoperative nausea and vomiting)    • Screening for colon cancer    • Tubulovillous adenoma         Social History:    Social History     Socioeconomic History   • Marital status:    Tobacco Use   • Smoking status: Never Smoker   • Smokeless tobacco: Never Used   Vaping Use   • Vaping Use: Never used   Substance and Sexual Activity   • Alcohol use: Not Currently     Alcohol/week: 1.0 standard drink     Types: 1 Glasses of wine per week     Comment: social   • Drug use: No   • Sexual activity: Yes       Family History:   Family History   Problem Relation Age of Onset   • Diverticulitis Father    • Colon polyps Father    • Celiac disease Sister    • Crohn's disease Sister    • Colon polyps Sister    • Colon cancer Maternal Grandmother    • Colon cancer Paternal Grandmother    • Cirrhosis Paternal Grandmother    • Heart disease Mother    • Colon polyps Mother    • Colon polyps Brother    • Colonic polyp Other    • Hyperlipidemia Other    • Hypertension Other        Surgical History:   Past Surgical History:   Procedure Laterality Date   • CERVICAL BIOPSY  W/ LOOP ELECTRODE EXCISION     • COLONOSCOPY  11/27/2018    SANTA gale M.D.   • LEEP  2008   • UPPER GASTROINTESTINAL ENDOSCOPY  11/27/2018    possible lakisha Akbar M.D.    • WISDOM TOOTH EXTRACTION           Current Facility-Administered Medications:   •  lactated ringers infusion, 30 mL/hr, Intravenous, Continuous PRN, Rolo, Nita G, APRN  •  sodium chloride 0.9 % flush 10 mL, 10 mL, Intravenous, PRN, Rolo, Inta G, APRN  •   sodium chloride 0.9 % flush 3 mL, 3 mL, Intravenous, Q12H, Rolo, Nita G, APRN    Allergies: No Known Allergies     The following portions of the patient's history were reviewed by me and updated as appropriate: review of systems, allergies, current medications, past family history, past medical history, past social history, past surgical history and problem list.    There were no vitals filed for this visit.    PHYSICAL EXAM:    CONSTITUTIONAL:  today's vital signs reviewed by me  GASTROINTESTINAL: abdomen is soft nontender nondistended with normal active bowel sounds, no masses are appreciated    Assessment/ Plan  We will proceed today with screening colonoscopy.    Risks and benefits as well as alternatives to endoscopic evaluation were explained to the patient and they voiced understanding and wish to proceed.  These risks include but are not limited to the risk of bleeding, perforation, adverse reaction to sedation, and missed lesions.  The patient was given the opportunity to ask questions prior to the endoscopic procedure.

## 2022-03-08 NOTE — ANESTHESIA PREPROCEDURE EVALUATION
Anesthesia Evaluation     history of anesthetic complications: PONV  NPO Solid Status: > 8 hours  NPO Liquid Status: > 2 hours           Airway   Mallampati: II  TM distance: >3 FB  Neck ROM: full  Dental - normal exam     Pulmonary - normal exam   Cardiovascular - normal exam  Exercise tolerance: good (4-7 METS)    (+) dysrhythmias Tachycardia,       Neuro/Psych  (+) psychiatric history Anxiety,    GI/Hepatic/Renal/Endo    (+)  GERD,      Musculoskeletal     Abdominal    Substance History      OB/GYN          Other                      Anesthesia Plan    ASA 2     MAC     intravenous induction     Anesthetic plan, all risks, benefits, and alternatives have been provided, discussed and informed consent has been obtained with: patient.        CODE STATUS:

## 2022-03-09 LAB
LAB AP CASE REPORT: NORMAL
LAB AP CLINICAL INFORMATION: NORMAL
PATH REPORT.FINAL DX SPEC: NORMAL
PATH REPORT.GROSS SPEC: NORMAL

## 2022-05-05 ENCOUNTER — APPOINTMENT (OUTPATIENT)
Dept: WOMENS IMAGING | Facility: HOSPITAL | Age: 47
End: 2022-05-05

## 2022-05-05 PROCEDURE — 77063 BREAST TOMOSYNTHESIS BI: CPT | Performed by: RADIOLOGY

## 2022-05-05 PROCEDURE — 77067 SCR MAMMO BI INCL CAD: CPT | Performed by: RADIOLOGY

## 2025-05-08 ENCOUNTER — HOSPITAL ENCOUNTER (OUTPATIENT)
Facility: HOSPITAL | Age: 50
Setting detail: OBSERVATION
Discharge: HOME | End: 2025-05-10
Attending: EMERGENCY MEDICINE | Admitting: EMERGENCY MEDICINE
Payer: COMMERCIAL

## 2025-05-08 DIAGNOSIS — K35.200 ACUTE APPENDICITIS WITH GENERALIZED PERITONITIS WITHOUT GANGRENE, PERFORATION, OR ABSCESS: Primary | ICD-10-CM

## 2025-05-08 DIAGNOSIS — G89.18 ACUTE POSTOPERATIVE PAIN: ICD-10-CM

## 2025-05-08 DIAGNOSIS — K35.80 APPENDICITIS, ACUTE: ICD-10-CM

## 2025-05-08 PROCEDURE — 99285 EMERGENCY DEPT VISIT HI MDM: CPT

## 2025-05-08 PROCEDURE — 80053 COMPREHEN METABOLIC PANEL: CPT | Performed by: EMERGENCY MEDICINE

## 2025-05-08 PROCEDURE — 81003 URINALYSIS AUTO W/O SCOPE: CPT | Performed by: EMERGENCY MEDICINE

## 2025-05-08 PROCEDURE — 84703 CHORIONIC GONADOTROPIN ASSAY: CPT | Performed by: EMERGENCY MEDICINE

## 2025-05-08 PROCEDURE — 85025 COMPLETE CBC W/AUTO DIFF WBC: CPT | Performed by: EMERGENCY MEDICINE

## 2025-05-08 PROCEDURE — 83690 ASSAY OF LIPASE: CPT | Performed by: EMERGENCY MEDICINE

## 2025-05-08 PROCEDURE — 83605 ASSAY OF LACTIC ACID: CPT | Performed by: EMERGENCY MEDICINE

## 2025-05-08 RX ORDER — SODIUM CHLORIDE 0.9 % (FLUSH) 0.9 %
10 SYRINGE (ML) INJECTION AS NEEDED
Status: DISCONTINUED | OUTPATIENT
Start: 2025-05-08 | End: 2025-05-10 | Stop reason: HOSPADM

## 2025-05-09 ENCOUNTER — ANESTHESIA EVENT (OUTPATIENT)
Dept: PERIOP | Facility: HOSPITAL | Age: 50
End: 2025-05-09
Payer: COMMERCIAL

## 2025-05-09 ENCOUNTER — APPOINTMENT (OUTPATIENT)
Dept: CT IMAGING | Facility: HOSPITAL | Age: 50
End: 2025-05-09
Payer: COMMERCIAL

## 2025-05-09 ENCOUNTER — ANESTHESIA (OUTPATIENT)
Dept: PERIOP | Facility: HOSPITAL | Age: 50
End: 2025-05-09
Payer: COMMERCIAL

## 2025-05-09 PROBLEM — K35.80 ACUTE APPENDICITIS: Status: ACTIVE | Noted: 2025-05-09

## 2025-05-09 LAB
ALBUMIN SERPL-MCNC: 4.5 G/DL (ref 3.5–5.2)
ALBUMIN/GLOB SERPL: 1.5 G/DL
ALP SERPL-CCNC: 69 U/L (ref 39–117)
ALT SERPL W P-5'-P-CCNC: 12 U/L (ref 1–33)
ANION GAP SERPL CALCULATED.3IONS-SCNC: 11.6 MMOL/L (ref 5–15)
ANION GAP SERPL CALCULATED.3IONS-SCNC: 12 MMOL/L (ref 5–15)
AST SERPL-CCNC: 18 U/L (ref 1–32)
BASOPHILS # BLD AUTO: 0.05 10*3/MM3 (ref 0–0.2)
BASOPHILS NFR BLD AUTO: 0.4 % (ref 0–1.5)
BILIRUB SERPL-MCNC: 0.9 MG/DL (ref 0–1.2)
BILIRUB UR QL STRIP: NEGATIVE
BUN SERPL-MCNC: 8 MG/DL (ref 6–20)
BUN SERPL-MCNC: 9 MG/DL (ref 6–20)
BUN/CREAT SERPL: 11.5 (ref 7–25)
BUN/CREAT SERPL: 13.3 (ref 7–25)
CALCIUM SPEC-SCNC: 9 MG/DL (ref 8.6–10.5)
CALCIUM SPEC-SCNC: 9.8 MG/DL (ref 8.6–10.5)
CHLORIDE SERPL-SCNC: 100 MMOL/L (ref 98–107)
CHLORIDE SERPL-SCNC: 102 MMOL/L (ref 98–107)
CLARITY UR: CLEAR
CO2 SERPL-SCNC: 23 MMOL/L (ref 22–29)
CO2 SERPL-SCNC: 23.4 MMOL/L (ref 22–29)
COLOR UR: YELLOW
CREAT SERPL-MCNC: 0.6 MG/DL (ref 0.57–1)
CREAT SERPL-MCNC: 0.78 MG/DL (ref 0.57–1)
D-LACTATE SERPL-SCNC: 1.3 MMOL/L (ref 0.5–2)
DEPRECATED RDW RBC AUTO: 38.6 FL (ref 37–54)
DEPRECATED RDW RBC AUTO: 40.2 FL (ref 37–54)
EGFRCR SERPLBLD CKD-EPI 2021: 109.5 ML/MIN/1.73
EGFRCR SERPLBLD CKD-EPI 2021: 92.7 ML/MIN/1.73
EOSINOPHIL # BLD AUTO: 0.13 10*3/MM3 (ref 0–0.4)
EOSINOPHIL NFR BLD AUTO: 1.1 % (ref 0.3–6.2)
ERYTHROCYTE [DISTWIDTH] IN BLOOD BY AUTOMATED COUNT: 12.2 % (ref 12.3–15.4)
ERYTHROCYTE [DISTWIDTH] IN BLOOD BY AUTOMATED COUNT: 12.4 % (ref 12.3–15.4)
GLOBULIN UR ELPH-MCNC: 3.1 GM/DL
GLUCOSE SERPL-MCNC: 109 MG/DL (ref 65–99)
GLUCOSE SERPL-MCNC: 136 MG/DL (ref 65–99)
GLUCOSE UR STRIP-MCNC: NEGATIVE MG/DL
HCG SERPL QL: NEGATIVE
HCT VFR BLD AUTO: 41.1 % (ref 34–46.6)
HCT VFR BLD AUTO: 46.2 % (ref 34–46.6)
HGB BLD-MCNC: 14 G/DL (ref 12–15.9)
HGB BLD-MCNC: 15.8 G/DL (ref 12–15.9)
HGB UR QL STRIP.AUTO: NEGATIVE
HOLD SPECIMEN: NORMAL
IMM GRANULOCYTES # BLD AUTO: 0.04 10*3/MM3 (ref 0–0.05)
IMM GRANULOCYTES NFR BLD AUTO: 0.3 % (ref 0–0.5)
KETONES UR QL STRIP: ABNORMAL
LEUKOCYTE ESTERASE UR QL STRIP.AUTO: NEGATIVE
LIPASE SERPL-CCNC: 38 U/L (ref 13–60)
LYMPHOCYTES # BLD AUTO: 1.92 10*3/MM3 (ref 0.7–3.1)
LYMPHOCYTES NFR BLD AUTO: 16.5 % (ref 19.6–45.3)
MCH RBC QN AUTO: 30.1 PG (ref 26.6–33)
MCH RBC QN AUTO: 30.4 PG (ref 26.6–33)
MCHC RBC AUTO-ENTMCNC: 34.1 G/DL (ref 31.5–35.7)
MCHC RBC AUTO-ENTMCNC: 34.2 G/DL (ref 31.5–35.7)
MCV RBC AUTO: 88 FL (ref 79–97)
MCV RBC AUTO: 89.3 FL (ref 79–97)
MONOCYTES # BLD AUTO: 0.72 10*3/MM3 (ref 0.1–0.9)
MONOCYTES NFR BLD AUTO: 6.2 % (ref 5–12)
NEUTROPHILS NFR BLD AUTO: 75.5 % (ref 42.7–76)
NEUTROPHILS NFR BLD AUTO: 8.8 10*3/MM3 (ref 1.7–7)
NITRITE UR QL STRIP: NEGATIVE
NRBC BLD AUTO-RTO: 0 /100 WBC (ref 0–0.2)
PH UR STRIP.AUTO: <=5 [PH] (ref 5–8)
PLATELET # BLD AUTO: 253 10*3/MM3 (ref 140–450)
PLATELET # BLD AUTO: 280 10*3/MM3 (ref 140–450)
PMV BLD AUTO: 8.9 FL (ref 6–12)
PMV BLD AUTO: 9 FL (ref 6–12)
POTASSIUM SERPL-SCNC: 3.5 MMOL/L (ref 3.5–5.2)
POTASSIUM SERPL-SCNC: 3.7 MMOL/L (ref 3.5–5.2)
PROT SERPL-MCNC: 7.6 G/DL (ref 6–8.5)
PROT UR QL STRIP: NEGATIVE
RBC # BLD AUTO: 4.6 10*6/MM3 (ref 3.77–5.28)
RBC # BLD AUTO: 5.25 10*6/MM3 (ref 3.77–5.28)
SODIUM SERPL-SCNC: 135 MMOL/L (ref 136–145)
SODIUM SERPL-SCNC: 137 MMOL/L (ref 136–145)
SP GR UR STRIP: 1.02 (ref 1–1.03)
UROBILINOGEN UR QL STRIP: ABNORMAL
WBC NRBC COR # BLD AUTO: 11.66 10*3/MM3 (ref 3.4–10.8)
WBC NRBC COR # BLD AUTO: 14.83 10*3/MM3 (ref 3.4–10.8)
WHOLE BLOOD HOLD COAG: NORMAL
WHOLE BLOOD HOLD SPECIMEN: NORMAL

## 2025-05-09 PROCEDURE — 25010000002 HYDROMORPHONE PER 4 MG: Performed by: EMERGENCY MEDICINE

## 2025-05-09 PROCEDURE — 25010000002 PIPERACILLIN SOD-TAZOBACTAM PER 1 G

## 2025-05-09 PROCEDURE — 25010000002 DEXAMETHASONE SODIUM PHOSPHATE 20 MG/5ML SOLUTION: Performed by: NURSE ANESTHETIST, CERTIFIED REGISTERED

## 2025-05-09 PROCEDURE — 99222 1ST HOSP IP/OBS MODERATE 55: CPT | Performed by: STUDENT IN AN ORGANIZED HEALTH CARE EDUCATION/TRAINING PROGRAM

## 2025-05-09 PROCEDURE — 25010000002 SUGAMMADEX 200 MG/2ML SOLUTION: Performed by: NURSE ANESTHETIST, CERTIFIED REGISTERED

## 2025-05-09 PROCEDURE — 25010000002 PROPOFOL 10 MG/ML EMULSION: Performed by: NURSE ANESTHETIST, CERTIFIED REGISTERED

## 2025-05-09 PROCEDURE — 25010000002 HYDROMORPHONE 1 MG/ML SOLUTION

## 2025-05-09 PROCEDURE — 25010000002 MAGNESIUM SULFATE PER 500 MG OF MAGNESIUM: Performed by: NURSE ANESTHETIST, CERTIFIED REGISTERED

## 2025-05-09 PROCEDURE — 74177 CT ABD & PELVIS W/CONTRAST: CPT

## 2025-05-09 PROCEDURE — G0378 HOSPITAL OBSERVATION PER HR: HCPCS

## 2025-05-09 PROCEDURE — 25010000002 PIPERACILLIN SOD-TAZOBACTAM PER 1 G: Performed by: PHYSICIAN ASSISTANT

## 2025-05-09 PROCEDURE — 80048 BASIC METABOLIC PNL TOTAL CA: CPT

## 2025-05-09 PROCEDURE — 88304 TISSUE EXAM BY PATHOLOGIST: CPT | Performed by: STUDENT IN AN ORGANIZED HEALTH CARE EDUCATION/TRAINING PROGRAM

## 2025-05-09 PROCEDURE — 25810000003 LACTATED RINGERS SOLUTION: Performed by: EMERGENCY MEDICINE

## 2025-05-09 PROCEDURE — 25010000002 FAMOTIDINE 10 MG/ML SOLUTION: Performed by: STUDENT IN AN ORGANIZED HEALTH CARE EDUCATION/TRAINING PROGRAM

## 2025-05-09 PROCEDURE — 96376 TX/PRO/DX INJ SAME DRUG ADON: CPT

## 2025-05-09 PROCEDURE — 85027 COMPLETE CBC AUTOMATED: CPT

## 2025-05-09 PROCEDURE — 25810000003 LACTATED RINGERS PER 1000 ML: Performed by: STUDENT IN AN ORGANIZED HEALTH CARE EDUCATION/TRAINING PROGRAM

## 2025-05-09 PROCEDURE — 25010000002 HYDROMORPHONE PER 4 MG

## 2025-05-09 PROCEDURE — 25010000002 KETOROLAC TROMETHAMINE PER 15 MG: Performed by: NURSE ANESTHETIST, CERTIFIED REGISTERED

## 2025-05-09 PROCEDURE — 25010000002 ONDANSETRON PER 1 MG

## 2025-05-09 PROCEDURE — 25510000001 IOPAMIDOL 61 % SOLUTION: Performed by: EMERGENCY MEDICINE

## 2025-05-09 PROCEDURE — 25010000002 ONDANSETRON PER 1 MG: Performed by: PHYSICIAN ASSISTANT

## 2025-05-09 PROCEDURE — 25010000002 HYDROMORPHONE 1 MG/ML SOLUTION: Performed by: PHYSICIAN ASSISTANT

## 2025-05-09 PROCEDURE — 44970 LAPAROSCOPY APPENDECTOMY: CPT | Performed by: PHYSICIAN ASSISTANT

## 2025-05-09 PROCEDURE — 25010000002 DIPHENHYDRAMINE PER 50 MG: Performed by: PHYSICIAN ASSISTANT

## 2025-05-09 PROCEDURE — 25010000002 MIDAZOLAM PER 1 MG: Performed by: STUDENT IN AN ORGANIZED HEALTH CARE EDUCATION/TRAINING PROGRAM

## 2025-05-09 PROCEDURE — 25010000002 ONDANSETRON PER 1 MG: Performed by: EMERGENCY MEDICINE

## 2025-05-09 PROCEDURE — 44970 LAPAROSCOPY APPENDECTOMY: CPT | Performed by: STUDENT IN AN ORGANIZED HEALTH CARE EDUCATION/TRAINING PROGRAM

## 2025-05-09 PROCEDURE — 96375 TX/PRO/DX INJ NEW DRUG ADDON: CPT

## 2025-05-09 PROCEDURE — 25010000002 DROPERIDOL PER 5 MG: Performed by: PHYSICIAN ASSISTANT

## 2025-05-09 PROCEDURE — 25010000002 LIDOCAINE 2% SOLUTION: Performed by: NURSE ANESTHETIST, CERTIFIED REGISTERED

## 2025-05-09 DEVICE — THE ECHELON, ECHELON ENDOPATH™ AND ECHELON FLEX™ FAMILIES OF ENDOSCOPIC LINEAR CUTTERS AND RELOADS ARE STERILE, SINGLE PATIENT USE INSTRUMENTS THAT SIMULTANEOUSLY CUT AND STAPLE TISSUE. THERE ARE SIX STAGGERED ROWS OF STAPLES, THREE ON EITHER SIDE OF THE CUT LINE. THE 45 MM INSTRUMENTS HAVE A STAPLE LINE THATIS APPROXIMATELY 45 MM LONG AND A CUT LINE THAT IS APPROXIMATELY 42 MM LONG. THE SHAFT CAN ROTATE FREELY IN BOTH DIRECTIONS AND AN ARTICULATION MECHANISM ON ARTICULATING INSTRUMENTS ENABLES BENDING THE DISTAL PORTIONOF THE SHAFT TO FACILITATE LATERAL ACCESS OF THE OPERATIVE SITE.THE INSTRUMENTS ARE SHIPPED WITHOUT A RELOAD AND MUST BE LOADED PRIOR TO USE. A STAPLE RETAINING CAP ON THE RELOAD PROTECTS THE STAPLE LEG POINTS DURING SHIPPING AND TRANSPORTATION. THE INSTRUMENTS’ LOCK-OUT FEATURE IS DESIGNED TO PREVENT A USED RELOAD FROM BEING REFIRED.
Type: IMPLANTABLE DEVICE | Site: ABDOMEN | Status: FUNCTIONAL
Brand: ECHELON ENDOPATH

## 2025-05-09 DEVICE — HORIZON TI ML 6 CLIPS/CART
Type: IMPLANTABLE DEVICE | Site: ABDOMEN | Status: FUNCTIONAL
Brand: WECK

## 2025-05-09 RX ORDER — IPRATROPIUM BROMIDE AND ALBUTEROL SULFATE 2.5; .5 MG/3ML; MG/3ML
3 SOLUTION RESPIRATORY (INHALATION) ONCE AS NEEDED
Status: DISCONTINUED | OUTPATIENT
Start: 2025-05-09 | End: 2025-05-09 | Stop reason: HOSPADM

## 2025-05-09 RX ORDER — DROPERIDOL 2.5 MG/ML
0.62 INJECTION, SOLUTION INTRAMUSCULAR; INTRAVENOUS
Status: DISCONTINUED | OUTPATIENT
Start: 2025-05-09 | End: 2025-05-09 | Stop reason: HOSPADM

## 2025-05-09 RX ORDER — FLUMAZENIL 0.1 MG/ML
0.2 INJECTION INTRAVENOUS AS NEEDED
Status: DISCONTINUED | OUTPATIENT
Start: 2025-05-09 | End: 2025-05-09 | Stop reason: HOSPADM

## 2025-05-09 RX ORDER — HYDROMORPHONE HYDROCHLORIDE 1 MG/ML
0.5 INJECTION, SOLUTION INTRAMUSCULAR; INTRAVENOUS; SUBCUTANEOUS ONCE
Status: COMPLETED | OUTPATIENT
Start: 2025-05-09 | End: 2025-05-09

## 2025-05-09 RX ORDER — MAGNESIUM HYDROXIDE 1200 MG/15ML
LIQUID ORAL AS NEEDED
Status: DISCONTINUED | OUTPATIENT
Start: 2025-05-09 | End: 2025-05-09 | Stop reason: HOSPADM

## 2025-05-09 RX ORDER — PROMETHAZINE HYDROCHLORIDE 25 MG/1
25 TABLET ORAL ONCE AS NEEDED
Status: DISCONTINUED | OUTPATIENT
Start: 2025-05-09 | End: 2025-05-09 | Stop reason: HOSPADM

## 2025-05-09 RX ORDER — SODIUM CHLORIDE 0.9 % (FLUSH) 0.9 %
3 SYRINGE (ML) INJECTION EVERY 12 HOURS SCHEDULED
Status: DISCONTINUED | OUTPATIENT
Start: 2025-05-09 | End: 2025-05-09 | Stop reason: HOSPADM

## 2025-05-09 RX ORDER — ATROPINE SULFATE 0.4 MG/ML
0.4 INJECTION, SOLUTION INTRAMUSCULAR; INTRAVENOUS; SUBCUTANEOUS ONCE AS NEEDED
Status: DISCONTINUED | OUTPATIENT
Start: 2025-05-09 | End: 2025-05-09 | Stop reason: HOSPADM

## 2025-05-09 RX ORDER — LIDOCAINE HYDROCHLORIDE 20 MG/ML
INJECTION, SOLUTION INFILTRATION; PERINEURAL AS NEEDED
Status: DISCONTINUED | OUTPATIENT
Start: 2025-05-09 | End: 2025-05-09 | Stop reason: SURG

## 2025-05-09 RX ORDER — HYDROCODONE BITARTRATE AND ACETAMINOPHEN 5; 325 MG/1; MG/1
1 TABLET ORAL ONCE AS NEEDED
Status: DISCONTINUED | OUTPATIENT
Start: 2025-05-09 | End: 2025-05-09 | Stop reason: HOSPADM

## 2025-05-09 RX ORDER — DEXMEDETOMIDINE HYDROCHLORIDE 100 UG/ML
INJECTION, SOLUTION INTRAVENOUS AS NEEDED
Status: DISCONTINUED | OUTPATIENT
Start: 2025-05-09 | End: 2025-05-09 | Stop reason: SURG

## 2025-05-09 RX ORDER — ONDANSETRON 2 MG/ML
4 INJECTION INTRAMUSCULAR; INTRAVENOUS ONCE
Status: COMPLETED | OUTPATIENT
Start: 2025-05-09 | End: 2025-05-09

## 2025-05-09 RX ORDER — ONDANSETRON 2 MG/ML
4 INJECTION INTRAMUSCULAR; INTRAVENOUS ONCE AS NEEDED
Status: DISCONTINUED | OUTPATIENT
Start: 2025-05-09 | End: 2025-05-09 | Stop reason: HOSPADM

## 2025-05-09 RX ORDER — BUPIVACAINE HYDROCHLORIDE AND EPINEPHRINE 2.5; 5 MG/ML; UG/ML
INJECTION, SOLUTION EPIDURAL; INFILTRATION; INTRACAUDAL; PERINEURAL AS NEEDED
Status: DISCONTINUED | OUTPATIENT
Start: 2025-05-09 | End: 2025-05-09 | Stop reason: HOSPADM

## 2025-05-09 RX ORDER — MAGNESIUM SULFATE HEPTAHYDRATE 500 MG/ML
INJECTION, SOLUTION INTRAMUSCULAR; INTRAVENOUS AS NEEDED
Status: DISCONTINUED | OUTPATIENT
Start: 2025-05-09 | End: 2025-05-09 | Stop reason: SURG

## 2025-05-09 RX ORDER — SODIUM CHLORIDE, SODIUM LACTATE, POTASSIUM CHLORIDE, CALCIUM CHLORIDE 600; 310; 30; 20 MG/100ML; MG/100ML; MG/100ML; MG/100ML
9 INJECTION, SOLUTION INTRAVENOUS CONTINUOUS
Status: DISCONTINUED | OUTPATIENT
Start: 2025-05-09 | End: 2025-05-09

## 2025-05-09 RX ORDER — DIPHENHYDRAMINE HYDROCHLORIDE 50 MG/ML
25 INJECTION, SOLUTION INTRAMUSCULAR; INTRAVENOUS ONCE
Status: COMPLETED | OUTPATIENT
Start: 2025-05-09 | End: 2025-05-09

## 2025-05-09 RX ORDER — DEXAMETHASONE SODIUM PHOSPHATE 4 MG/ML
INJECTION, SOLUTION INTRA-ARTICULAR; INTRALESIONAL; INTRAMUSCULAR; INTRAVENOUS; SOFT TISSUE AS NEEDED
Status: DISCONTINUED | OUTPATIENT
Start: 2025-05-09 | End: 2025-05-09 | Stop reason: SURG

## 2025-05-09 RX ORDER — ACETAMINOPHEN 500 MG
1000 TABLET ORAL EVERY 8 HOURS
Status: DISCONTINUED | OUTPATIENT
Start: 2025-05-09 | End: 2025-05-10 | Stop reason: HOSPADM

## 2025-05-09 RX ORDER — DIPHENHYDRAMINE HYDROCHLORIDE 50 MG/ML
12.5 INJECTION, SOLUTION INTRAMUSCULAR; INTRAVENOUS
Status: DISCONTINUED | OUTPATIENT
Start: 2025-05-09 | End: 2025-05-09 | Stop reason: HOSPADM

## 2025-05-09 RX ORDER — HYDROCODONE BITARTRATE AND ACETAMINOPHEN 5; 325 MG/1; MG/1
1 TABLET ORAL EVERY 6 HOURS PRN
Qty: 12 TABLET | Refills: 0 | Status: SHIPPED | OUTPATIENT
Start: 2025-05-09 | End: 2025-05-12

## 2025-05-09 RX ORDER — FENTANYL CITRATE 50 UG/ML
50 INJECTION, SOLUTION INTRAMUSCULAR; INTRAVENOUS ONCE AS NEEDED
Status: DISCONTINUED | OUTPATIENT
Start: 2025-05-09 | End: 2025-05-09 | Stop reason: HOSPADM

## 2025-05-09 RX ORDER — SODIUM CHLORIDE 0.9 % (FLUSH) 0.9 %
3-10 SYRINGE (ML) INJECTION AS NEEDED
Status: DISCONTINUED | OUTPATIENT
Start: 2025-05-09 | End: 2025-05-09 | Stop reason: HOSPADM

## 2025-05-09 RX ORDER — ROCURONIUM BROMIDE 10 MG/ML
INJECTION, SOLUTION INTRAVENOUS AS NEEDED
Status: DISCONTINUED | OUTPATIENT
Start: 2025-05-09 | End: 2025-05-09 | Stop reason: SURG

## 2025-05-09 RX ORDER — MIDAZOLAM HYDROCHLORIDE 1 MG/ML
1 INJECTION, SOLUTION INTRAMUSCULAR; INTRAVENOUS
Status: COMPLETED | OUTPATIENT
Start: 2025-05-09 | End: 2025-05-09

## 2025-05-09 RX ORDER — SCOPOLAMINE 1 MG/3D
1 PATCH, EXTENDED RELEASE TRANSDERMAL ONCE
Status: DISCONTINUED | OUTPATIENT
Start: 2025-05-09 | End: 2025-05-10 | Stop reason: HOSPADM

## 2025-05-09 RX ORDER — MULTIPLE VITAMINS W/ MINERALS TAB 9MG-400MCG
1 TAB ORAL DAILY
Status: DISCONTINUED | OUTPATIENT
Start: 2025-05-10 | End: 2025-05-10 | Stop reason: HOSPADM

## 2025-05-09 RX ORDER — SODIUM CHLORIDE 0.9 % (FLUSH) 0.9 %
10 SYRINGE (ML) INJECTION EVERY 12 HOURS SCHEDULED
Status: DISCONTINUED | OUTPATIENT
Start: 2025-05-09 | End: 2025-05-10 | Stop reason: HOSPADM

## 2025-05-09 RX ORDER — SODIUM CHLORIDE 9 MG/ML
40 INJECTION, SOLUTION INTRAVENOUS AS NEEDED
Status: DISCONTINUED | OUTPATIENT
Start: 2025-05-09 | End: 2025-05-10 | Stop reason: HOSPADM

## 2025-05-09 RX ORDER — PROMETHAZINE HYDROCHLORIDE 25 MG/1
25 SUPPOSITORY RECTAL ONCE AS NEEDED
Status: DISCONTINUED | OUTPATIENT
Start: 2025-05-09 | End: 2025-05-09 | Stop reason: HOSPADM

## 2025-05-09 RX ORDER — ONDANSETRON 4 MG/1
4 TABLET, ORALLY DISINTEGRATING ORAL EVERY 6 HOURS PRN
Status: DISCONTINUED | OUTPATIENT
Start: 2025-05-09 | End: 2025-05-10 | Stop reason: HOSPADM

## 2025-05-09 RX ORDER — EPHEDRINE SULFATE 50 MG/ML
5 INJECTION, SOLUTION INTRAVENOUS ONCE AS NEEDED
Status: DISCONTINUED | OUTPATIENT
Start: 2025-05-09 | End: 2025-05-09 | Stop reason: HOSPADM

## 2025-05-09 RX ORDER — DROPERIDOL 2.5 MG/ML
1.25 INJECTION, SOLUTION INTRAMUSCULAR; INTRAVENOUS ONCE
Status: COMPLETED | OUTPATIENT
Start: 2025-05-09 | End: 2025-05-09

## 2025-05-09 RX ORDER — ONDANSETRON 4 MG/1
4 TABLET, ORALLY DISINTEGRATING ORAL EVERY 8 HOURS PRN
Qty: 9 TABLET | Refills: 0 | Status: SHIPPED | OUTPATIENT
Start: 2025-05-09 | End: 2025-05-12

## 2025-05-09 RX ORDER — ASCORBIC ACID 500 MG
1000 TABLET ORAL DAILY
Status: DISCONTINUED | OUTPATIENT
Start: 2025-05-10 | End: 2025-05-10 | Stop reason: HOSPADM

## 2025-05-09 RX ORDER — TRETINOIN 0.25 MG/G
1 CREAM TOPICAL NIGHTLY PRN
COMMUNITY
Start: 2025-04-10

## 2025-05-09 RX ORDER — SODIUM CHLORIDE 0.9 % (FLUSH) 0.9 %
10 SYRINGE (ML) INJECTION AS NEEDED
Status: DISCONTINUED | OUTPATIENT
Start: 2025-05-09 | End: 2025-05-10 | Stop reason: HOSPADM

## 2025-05-09 RX ORDER — IOPAMIDOL 612 MG/ML
100 INJECTION, SOLUTION INTRAVASCULAR
Status: COMPLETED | OUTPATIENT
Start: 2025-05-09 | End: 2025-05-09

## 2025-05-09 RX ORDER — NALOXONE HCL 0.4 MG/ML
0.2 VIAL (ML) INJECTION AS NEEDED
Status: DISCONTINUED | OUTPATIENT
Start: 2025-05-09 | End: 2025-05-09 | Stop reason: HOSPADM

## 2025-05-09 RX ORDER — LIDOCAINE HYDROCHLORIDE 10 MG/ML
0.5 INJECTION, SOLUTION INFILTRATION; PERINEURAL ONCE AS NEEDED
Status: DISCONTINUED | OUTPATIENT
Start: 2025-05-09 | End: 2025-05-09 | Stop reason: HOSPADM

## 2025-05-09 RX ORDER — HYDRALAZINE HYDROCHLORIDE 20 MG/ML
5 INJECTION INTRAMUSCULAR; INTRAVENOUS
Status: DISCONTINUED | OUTPATIENT
Start: 2025-05-09 | End: 2025-05-09 | Stop reason: HOSPADM

## 2025-05-09 RX ORDER — HYDROMORPHONE HYDROCHLORIDE 1 MG/ML
0.25 INJECTION, SOLUTION INTRAMUSCULAR; INTRAVENOUS; SUBCUTANEOUS
Status: DISCONTINUED | OUTPATIENT
Start: 2025-05-09 | End: 2025-05-10 | Stop reason: HOSPADM

## 2025-05-09 RX ORDER — HYDROCODONE BITARTRATE AND ACETAMINOPHEN 5; 325 MG/1; MG/1
1 TABLET ORAL EVERY 6 HOURS PRN
Status: DISCONTINUED | OUTPATIENT
Start: 2025-05-09 | End: 2025-05-10 | Stop reason: HOSPADM

## 2025-05-09 RX ORDER — HYDROMORPHONE HYDROCHLORIDE 1 MG/ML
0.5 INJECTION, SOLUTION INTRAMUSCULAR; INTRAVENOUS; SUBCUTANEOUS
Status: DISCONTINUED | OUTPATIENT
Start: 2025-05-09 | End: 2025-05-09 | Stop reason: HOSPADM

## 2025-05-09 RX ORDER — ALBUTEROL SULFATE 90 UG/1
2 INHALANT RESPIRATORY (INHALATION) EVERY 4 HOURS PRN
COMMUNITY
Start: 2024-07-03 | End: 2025-07-03

## 2025-05-09 RX ORDER — FENTANYL CITRATE 50 UG/ML
50 INJECTION, SOLUTION INTRAMUSCULAR; INTRAVENOUS
Status: DISCONTINUED | OUTPATIENT
Start: 2025-05-09 | End: 2025-05-09 | Stop reason: HOSPADM

## 2025-05-09 RX ORDER — EPHEDRINE SULFATE 50 MG/ML
INJECTION INTRAVENOUS AS NEEDED
Status: DISCONTINUED | OUTPATIENT
Start: 2025-05-09 | End: 2025-05-09 | Stop reason: SURG

## 2025-05-09 RX ORDER — FERROUS SULFATE 325(65) MG
325 TABLET ORAL
Status: DISCONTINUED | OUTPATIENT
Start: 2025-05-10 | End: 2025-05-10 | Stop reason: HOSPADM

## 2025-05-09 RX ORDER — FAMOTIDINE 10 MG/ML
20 INJECTION, SOLUTION INTRAVENOUS ONCE
Status: COMPLETED | OUTPATIENT
Start: 2025-05-09 | End: 2025-05-09

## 2025-05-09 RX ORDER — LABETALOL HYDROCHLORIDE 5 MG/ML
5 INJECTION, SOLUTION INTRAVENOUS
Status: DISCONTINUED | OUTPATIENT
Start: 2025-05-09 | End: 2025-05-09 | Stop reason: HOSPADM

## 2025-05-09 RX ORDER — ALBUTEROL SULFATE 0.83 MG/ML
2.5 SOLUTION RESPIRATORY (INHALATION) EVERY 4 HOURS PRN
Status: DISCONTINUED | OUTPATIENT
Start: 2025-05-09 | End: 2025-05-10 | Stop reason: HOSPADM

## 2025-05-09 RX ORDER — HYDROMORPHONE HYDROCHLORIDE 1 MG/ML
0.5 INJECTION, SOLUTION INTRAMUSCULAR; INTRAVENOUS; SUBCUTANEOUS EVERY 4 HOURS PRN
Refills: 0 | Status: DISCONTINUED | OUTPATIENT
Start: 2025-05-09 | End: 2025-05-10 | Stop reason: HOSPADM

## 2025-05-09 RX ORDER — OXYCODONE AND ACETAMINOPHEN 7.5; 325 MG/1; MG/1
1 TABLET ORAL EVERY 4 HOURS PRN
Status: DISCONTINUED | OUTPATIENT
Start: 2025-05-09 | End: 2025-05-09 | Stop reason: HOSPADM

## 2025-05-09 RX ORDER — KETOROLAC TROMETHAMINE 30 MG/ML
INJECTION, SOLUTION INTRAMUSCULAR; INTRAVENOUS AS NEEDED
Status: DISCONTINUED | OUTPATIENT
Start: 2025-05-09 | End: 2025-05-09 | Stop reason: SURG

## 2025-05-09 RX ORDER — HYDROXYZINE HYDROCHLORIDE 10 MG/1
10 TABLET, FILM COATED ORAL NIGHTLY PRN
COMMUNITY
Start: 2025-04-10

## 2025-05-09 RX ORDER — ONDANSETRON 2 MG/ML
4 INJECTION INTRAMUSCULAR; INTRAVENOUS EVERY 6 HOURS PRN
Status: DISCONTINUED | OUTPATIENT
Start: 2025-05-09 | End: 2025-05-10 | Stop reason: HOSPADM

## 2025-05-09 RX ORDER — PROPOFOL 10 MG/ML
VIAL (ML) INTRAVENOUS AS NEEDED
Status: DISCONTINUED | OUTPATIENT
Start: 2025-05-09 | End: 2025-05-09 | Stop reason: SURG

## 2025-05-09 RX ADMIN — ROCURONIUM BROMIDE 50 MG: 10 INJECTION INTRAVENOUS at 18:02

## 2025-05-09 RX ADMIN — HYDROMORPHONE HYDROCHLORIDE 0.5 MG: 1 INJECTION, SOLUTION INTRAMUSCULAR; INTRAVENOUS; SUBCUTANEOUS at 00:46

## 2025-05-09 RX ADMIN — KETOROLAC TROMETHAMINE 30 MG: 30 INJECTION, SOLUTION INTRAMUSCULAR; INTRAVENOUS at 18:33

## 2025-05-09 RX ADMIN — HYDROMORPHONE HYDROCHLORIDE 0.5 MG: 1 INJECTION, SOLUTION INTRAMUSCULAR; INTRAVENOUS; SUBCUTANEOUS at 18:15

## 2025-05-09 RX ADMIN — PIPERACILLIN AND TAZOBACTAM 3.38 G: 3; .375 INJECTION, POWDER, FOR SOLUTION INTRAVENOUS at 01:31

## 2025-05-09 RX ADMIN — DEXAMETHASONE SODIUM PHOSPHATE 8 MG: 4 INJECTION, SOLUTION INTRAMUSCULAR; INTRAVENOUS at 18:02

## 2025-05-09 RX ADMIN — ONDANSETRON 4 MG: 2 INJECTION, SOLUTION INTRAMUSCULAR; INTRAVENOUS at 10:21

## 2025-05-09 RX ADMIN — SODIUM CHLORIDE 3.38 G: 9 INJECTION, SOLUTION INTRAVENOUS at 16:17

## 2025-05-09 RX ADMIN — LIDOCAINE HYDROCHLORIDE 60 MG: 20 INJECTION, SOLUTION INFILTRATION; PERINEURAL at 18:02

## 2025-05-09 RX ADMIN — HYDROMORPHONE HYDROCHLORIDE 1 MG: 1 INJECTION, SOLUTION INTRAMUSCULAR; INTRAVENOUS; SUBCUTANEOUS at 10:16

## 2025-05-09 RX ADMIN — SODIUM CHLORIDE 3.38 G: 9 INJECTION, SOLUTION INTRAVENOUS at 07:56

## 2025-05-09 RX ADMIN — Medication 10 ML: at 09:41

## 2025-05-09 RX ADMIN — DEXMEDETOMIDINE HYDROCHLORIDE 20 MCG: 100 INJECTION, SOLUTION INTRAVENOUS at 18:02

## 2025-05-09 RX ADMIN — DROPERIDOL 1.25 MG: 2.5 INJECTION, SOLUTION INTRAMUSCULAR; INTRAVENOUS at 01:33

## 2025-05-09 RX ADMIN — Medication 10 ML: at 04:06

## 2025-05-09 RX ADMIN — HYDROMORPHONE HYDROCHLORIDE 0.5 MG: 1 INJECTION, SOLUTION INTRAMUSCULAR; INTRAVENOUS; SUBCUTANEOUS at 18:39

## 2025-05-09 RX ADMIN — HYDROMORPHONE HYDROCHLORIDE 1 MG: 1 INJECTION, SOLUTION INTRAMUSCULAR; INTRAVENOUS; SUBCUTANEOUS at 05:34

## 2025-05-09 RX ADMIN — MAGNESIUM SULFATE HEPTAHYDRATE 1 G: 500 INJECTION, SOLUTION INTRAMUSCULAR; INTRAVENOUS at 18:11

## 2025-05-09 RX ADMIN — HYDROMORPHONE HYDROCHLORIDE 0.5 MG: 1 INJECTION, SOLUTION INTRAMUSCULAR; INTRAVENOUS; SUBCUTANEOUS at 07:56

## 2025-05-09 RX ADMIN — MIDAZOLAM 1 MG: 1 INJECTION INTRAMUSCULAR; INTRAVENOUS at 16:20

## 2025-05-09 RX ADMIN — PROPOFOL 150 MG: 10 INJECTION, EMULSION INTRAVENOUS at 18:02

## 2025-05-09 RX ADMIN — SCOPOLAMINE 1 PATCH: 1.5 PATCH, EXTENDED RELEASE TRANSDERMAL at 16:11

## 2025-05-09 RX ADMIN — SUGAMMADEX 132 MG: 100 INJECTION, SOLUTION INTRAVENOUS at 18:33

## 2025-05-09 RX ADMIN — ACETAMINOPHEN 1000 MG: 500 TABLET, FILM COATED ORAL at 22:06

## 2025-05-09 RX ADMIN — FAMOTIDINE 20 MG: 10 INJECTION INTRAVENOUS at 16:14

## 2025-05-09 RX ADMIN — ONDANSETRON 4 MG: 2 INJECTION, SOLUTION INTRAMUSCULAR; INTRAVENOUS at 00:07

## 2025-05-09 RX ADMIN — SODIUM CHLORIDE, POTASSIUM CHLORIDE, SODIUM LACTATE AND CALCIUM CHLORIDE 9 ML/HR: 600; 310; 30; 20 INJECTION, SOLUTION INTRAVENOUS at 16:11

## 2025-05-09 RX ADMIN — ONDANSETRON 4 MG: 2 INJECTION, SOLUTION INTRAMUSCULAR; INTRAVENOUS at 00:55

## 2025-05-09 RX ADMIN — HYDROMORPHONE HYDROCHLORIDE 0.5 MG: 1 INJECTION, SOLUTION INTRAMUSCULAR; INTRAVENOUS; SUBCUTANEOUS at 00:07

## 2025-05-09 RX ADMIN — SODIUM CHLORIDE, POTASSIUM CHLORIDE, SODIUM LACTATE AND CALCIUM CHLORIDE 1000 ML: 600; 310; 30; 20 INJECTION, SOLUTION INTRAVENOUS at 00:08

## 2025-05-09 RX ADMIN — EPHEDRINE SULFATE 5 MG: 50 INJECTION INTRAVENOUS at 18:21

## 2025-05-09 RX ADMIN — DIPHENHYDRAMINE HYDROCHLORIDE 25 MG: 50 INJECTION, SOLUTION INTRAMUSCULAR; INTRAVENOUS at 01:50

## 2025-05-09 RX ADMIN — ONDANSETRON 4 MG: 2 INJECTION, SOLUTION INTRAMUSCULAR; INTRAVENOUS at 18:33

## 2025-05-09 RX ADMIN — MIDAZOLAM 1 MG: 1 INJECTION INTRAMUSCULAR; INTRAVENOUS at 16:14

## 2025-05-09 RX ADMIN — HYDROMORPHONE HYDROCHLORIDE 0.5 MG: 1 INJECTION, SOLUTION INTRAMUSCULAR; INTRAVENOUS; SUBCUTANEOUS at 14:05

## 2025-05-09 RX ADMIN — PROPOFOL 175 MCG/KG/MIN: 10 INJECTION, EMULSION INTRAVENOUS at 18:03

## 2025-05-09 RX ADMIN — HYDROMORPHONE HYDROCHLORIDE 0.5 MG: 1 INJECTION, SOLUTION INTRAMUSCULAR; INTRAVENOUS; SUBCUTANEOUS at 03:37

## 2025-05-09 RX ADMIN — IOPAMIDOL 85 ML: 612 INJECTION, SOLUTION INTRAVENOUS at 00:38

## 2025-05-09 NOTE — PLAN OF CARE
Goal Outcome Evaluation:      Pt admitted for further evaluation of nausea, vomiting, and acute right lower quadrant pain. No nausea, or pain reported at this time. NPO, General Surgery consulted.

## 2025-05-09 NOTE — OP NOTE
Operative Note :  Loraine Casarez MD    Patient Name and :  Flora Cruz  1975    Procedure Date:   25    Pre-op Diagnosis:  Acute appendicitis with localized peritonitis    Post-Operative Diagnosis:  Acute gangrenous appendicitis with localized peritonitis without rupture or abscess    Procedure:   Laparoscopic appendectomy    Surgeon:   Loraine Casarez MD    Assistant:   Assistant: Estela Dueñas PA-C was responsible for performing the following activities: Suturing, Closing, Placing Dressing, and Held/Positioned Camera and their skilled assistance was necessary for the success of this case.    Anesthesia:    General (general endotracheal tube)    Estimated Blood Loss:   5 mL    Specimens:   Appendix    Complications:   None    Indications:  Patient is a 49-year-old lady who presented the hospital with acute appendicitis.  She was recommended to undergo laparoscopic possible open appendectomy with possible ileocecectomy.  Informed consent was obtained.    Findings:   Acute gangrenous appendicitis with small amount of turbid fluid in the pelvis without rupture or abscess    Description of procedure:  After informed consent was obtained, the patient was brought to the operating room and placed supine on operating table.  SCDs were applied and functioning prior to intubation.  General endotracheal anesthesia was induced.  A Saucedo was placed by the nursing staff.  The abdomen was prepped and draped in usual sterile fashion.  Preoperative antibiotics were given.  A timeout was performed.    The abdomen was entered and a direct optical view technique starting in the left upper quadrant through a 5 mm incision.  The trocar was advanced through abdominal wall and all layers were visualized upon entry.  The abdomen was insufflated, inspected, and there was no evidence of injury from entry.  Subsequently a 5 mm trocar was placed in the suprapubic region under direct visualization.  Following  this a 12 mm trocar was placed in the left lower quadrant under direct visualization.  The patient was placed with the head down and right side up.  The abdomen was inspected and the appendix was easily identified in the right lower quadrant adherent to piece of small bowel and the right lateral abdominal wall.  The appendix was easily peeled off of these structures and the base identified.  The retroappendiceal window was made with a [place].  Then a single load of the laparoscopic 45 mm blue [place] stapler was fired to divide the appendiceal base including the cuff of healthy cecum.  The harmonic device was used to divide the appendiceal mesentery.  A couple of clips were placed on the appendiceal staple line and the mesentery to stop minor bleeding.  The abdomen was suctioned and inspected and hemostasis was complete.  The pelvis was suctioned of some fluid.  Then the trocars were removed under direct visualization after the appendix was removed from the left lower quadrant trocar in the Endo Catch bag.  The left lower quadrant incision was closed at the fascia using a 0 Vicryl passed via an M Close device. Then all skin incisions were closed using 4-0 Monocryl.  Skin glue was applied.  The patient tolerated procedure without complication.  All counts were correct at the end the case.  The patient was awoken and transferred to PACU in stable condition.    Loraine Casarez MD  General Surgery  [facility]    [address] [place] [address], [address]  [place] [address]  P: [phone]  F: [phone]

## 2025-05-09 NOTE — ED NOTES
"..Nursing report ED to floor  Flora Cruz  49 y.o.  female    HPI :  HPI  Stated Reason for Visit: Patient to ED with complaints of abdominal pain, nausea, and vomiting x2 hours. Patient took 500mg Tylenol at 2100 with no relief  History Obtained From: patient    Chief Complaint  Chief Complaint   Patient presents with    Abdominal Pain       Admitting doctor:   Gera Srinivasan MD    Admitting diagnosis:   The encounter diagnosis was Acute appendicitis with generalized peritonitis without gangrene, perforation, or abscess.    Code status:   Current Code Status       Date Active Code Status Order ID Comments User Context       5/9/2025 0123 CPR (Attempt to Resuscitate) [id number]  Natalia Zamorano, APRN ED        Question Answer    Code Status (Patient has no pulse and is not breathing) CPR (Attempt to Resuscitate)    Medical Interventions (Patient has pulse or is breathing) Full Support                    Allergies:   Patient has no known allergies.    Isolation:   No active isolations    Intake and Output  No intake or output data in the 24 hours ending 05/09/25 0129    Weight:       05/08/25  2335   Weight: 65.8 kg (145 lb)       Most recent vitals:   Vitals:    05/08/25 2335 05/08/25 2337 05/08/25 2338 05/09/25 0031   BP:   (!) 159/102 144/83   Pulse: 94   56   Resp: 20      Temp:  98.7 °F (37.1 °C)     SpO2: 97%   97%   Weight: 65.8 kg (145 lb)      Height: 167.6 cm (66\")          Active LDAs/IV Access:   Lines, Drains & Airways       Active LDAs       Name Placement date Placement time Site Days    Peripheral IV 05/08/25 2354 20 G Right Antecubital 05/08/25  2354  Antecubital  less than 1                    Labs (abnormal labs have a star):   Labs Reviewed   COMPREHENSIVE METABOLIC PANEL - Abnormal; Notable for the following components:       Result Value    Glucose 109 (*)     Sodium 135 (*)     All other components within normal limits    Narrative:     GFR Categories in Chronic Kidney Disease (CKD)        "       GFR Category          GFR (mL/min/1.73)    Interpretation  G1                    90 or greater        Normal or high (1)  G2                    60-89                Mild decrease (1)  G3a                   45-59                Mild to moderate decrease  G3b                   30-44                Moderate to severe decrease  G4                    15-29                Severe decrease  G5                    14 or less           Kidney failure    (1)In the absence of evidence of kidney disease, neither GFR category G1 or G2 fulfill the criteria for CKD.    eGFR calculation 2021 CKD-EPI creatinine equation, which does not include race as a factor   URINALYSIS W/ MICROSCOPIC IF INDICATED (NO CULTURE) - Abnormal; Notable for the following components:    Ketones, UA 40 mg/dL (2+) (*)     All other components within normal limits    Narrative:     Urine microscopic not indicated.   CBC WITH AUTO DIFFERENTIAL - Abnormal; Notable for the following components:    WBC 11.66 (*)     RDW 12.2 (*)     Lymphocyte % 16.5 (*)     Neutrophils, Absolute 8.80 (*)     All other components within normal limits   LIPASE - Normal   LACTIC ACID, PLASMA - Normal   HCG, SERUM, QUALITATIVE - Normal   RAINBOW DRAW    Narrative:     The following orders were created for panel order [place] Draw.  Procedure                               Abnormality         Status                     ---------                               -----------         ------                     Green Top (Gel)[[id number]]                                  Final result               Lavender Top[[id number]]                                     Final result               Gold Top - SST[[id number]]                                                              Light Blue Top[[id number]]                                   Final result                 Please view results for these tests on the individual orders.   CBC (NO DIFF)   BASIC METABOLIC PANEL   CBC AND DIFFERENTIAL     Narrative:     The following orders were created for panel order CBC & Differential.  Procedure                               Abnormality         Status                     ---------                               -----------         ------                     CBC Auto Differential[[id number]]        Abnormal            Final result                 Please view results for these tests on the individual orders.   GREEN TOP   LAVENDER TOP   LIGHT BLUE TOP       EKG:   No orders to display       Meds given in ED:   Medications   sodium chloride 0.9 % flush 10 mL (has no administration in time range)   piperacillin-tazobactam (ZOSYN) 3.375 g IVPB in 100 mL NS MBP (CD) (has no administration in time range)   sodium chloride 0.9 % flush 10 mL (has no administration in time range)   sodium chloride 0.9 % flush 10 mL (has no administration in time range)   sodium chloride 0.9 % infusion 40 mL (has no administration in time range)   ondansetron ODT (ZOFRAN-ODT) disintegrating tablet 4 mg (has no administration in time range)     Or   ondansetron (ZOFRAN) injection 4 mg (has no administration in time range)   piperacillin-tazobactam (ZOSYN) 3.375 g IVPB in 100 mL NS MBP (CD) (has no administration in time range)   HYDROmorphone (DILAUDID) injection 0.5 mg (0.5 mg Intravenous Given 5/9/25 0007)   ondansetron (ZOFRAN) injection 4 mg (4 mg Intravenous Given 5/9/25 0007)   lactated ringers bolus 1,000 mL (1,000 mL Intravenous New Bag 5/9/25 0008)   HYDROmorphone (DILAUDID) injection 0.5 mg (0.5 mg Intravenous Given 5/9/25 0046)   iopamidol (ISOVUE-300) 61 % injection 100 mL (85 mL Intravenous Given by Other 5/9/25 0038)   ondansetron (ZOFRAN) injection 4 mg (4 mg Intravenous Given 5/9/25 0055)       Imaging results:  CT Abdomen Pelvis With Contrast  Result Date: 5/9/2025   Dilated appendix with appendicoliths at both the appendiceal base and distally within the appendix, suggesting possible early acute appendicitis. No abscess or  bowel obstruction.     This report was finalized on 5/9/2025 1:00 AM by Dr. Osvaldo Ragland M.D on Workstation: [id number]        Ambulatory status:   - stand by    Social issues:   Social History     Socioeconomic History    Marital status:    Tobacco Use    Smoking status: Never    Smokeless tobacco: Never   Vaping Use    Vaping status: Never Used   Substance and Sexual Activity    Alcohol use: Not Currently     Alcohol/week: 1.0 standard drink of alcohol     Types: 1 Glasses of wine per week     Comment: social    Drug use: No    Sexual activity: Yes       Peripheral Neurovascular  Peripheral Neurovascular (Adult)  Peripheral Neurovascular WDL: WDL    Neuro Cognitive  Neuro Cognitive (Adult)  Cognitive/Neuro/Behavioral WDL: WDL    Learning  Learning Assessment  Learning Readiness and Ability: no barriers identified    Respiratory  Respiratory WDL  Respiratory WDL: WDL    Abdominal Pain       Pain Assessments  Pain (Adult)  (0-10) Pain Rating: Rest: 8  (0-10) Pain Rating: Activity: 10  Pain Location: abdomen  Pain Side/Orientation: generalized    NIH Stroke Scale       Sanaz Wetzel RN  05/09/25 01:29 EDT

## 2025-05-09 NOTE — H&P
[facility]   HISTORY AND PHYSICAL    Patient Name: Flora Cruz  : 1975  MRN: [id number]  Primary Care Physician:  Leelee Doe MD  Date of admission: 2025    Patient Care Team:  Leelee Doe MD as PCP - General (Internal Medicine)  Alana Brown MD as Consulting Physician (Obstetrics and Gynecology)       Subjective   Subjective     Chief Complaint:   Chief Complaint   Patient presents with    Abdominal Pain         HPI:    Flora Cruz is a 49 y.o. female, with past medical history including, but not limited to, anxiety, was admitted to the observation unit with a complaint of nausea, vomiting, and acute right lower quadrant pain.  States that it began around 8 PM last night after traveling.  She denies any fever, chills, diarrhea, urinary symptoms, or any other complaints.  General surgery has been consulted to see the patient this a.m.  She has been started on Zosyn 3.375 mg IV every 8 hours.    Review of Systems   All systems were reviewed and negative except for: What was mentioned above in the HPI.    Personal History     Past Medical History:   Diagnosis Date    Chest pain     Dysphagia     Epigastric pain     History of panic attacks     PONV (postoperative nausea and vomiting)     Screening for colon cancer     Tubulovillous adenoma        Past Surgical History:   Procedure Laterality Date    CERVICAL BIOPSY  W/ LOOP ELECTRODE EXCISION      COLONOSCOPY  2018    SANTA gale M.D.    COLONOSCOPY N/A 3/8/2022    Procedure: COLONOSCOPY with Polypectomy;  Surgeon: Leandro Akbar MD;  Location: [facility] OR;  Service: Gastroenterology;  Laterality: N/A;  Hemorrhoids, Diverticulosis, Polyp    LEEP      UPPER GASTROINTESTINAL ENDOSCOPY  2018    possible celiac - GIO Akbar     WISDOM TOOTH EXTRACTION         Family History: family history includes Celiac disease in her sister; Cirrhosis in her paternal grandmother; Colon cancer in her maternal  grandmother and paternal grandmother; Colon polyps in her brother, father, mother, and sister; Colonic polyp in an other family member; Crohn's disease in her sister; Diverticulitis in her father; Heart disease in her mother; Hyperlipidemia in an other family member; Hypertension in an other family member. Otherwise pertinent FHx was reviewed and not pertinent to current issue.    Social History:  reports that she has never smoked. She has never used smokeless tobacco. She reports that she does not currently use alcohol after a past usage of about 1.0 standard drink of alcohol per week. She reports that she does not use drugs.    Home Medications:  FLUoxetine, Omega-3 Fatty Acids, ferrous sulfate, and multivitamin with minerals    Allergies:  No Known Allergies    Objective   Objective     Vitals:   Temp:  [98.7 °F (37.1 °C)] 98.7 °F (37.1 °C)  Heart Rate:  [56-94] 56  Resp:  [20] 20  BP: (144-159)/() 144/83  Physical Exam    Constitutional: Awake, alert   Eyes: PERRLA, sclerae anicteric, no conjunctival injection   HENT: NCAT, mucous membranes moist   Neck: Supple, no thyromegaly, no lymphadenopathy, trachea midline   Respiratory: Clear to auscultation bilaterally, nonlabored respirations    Cardiovascular: RRR, no murmurs, rubs, or gallops, palpable pedal pulses bilaterally   Gastrointestinal: Positive bowel sounds, soft, generalized tenderness, worse in right lower quadrant   Musculoskeletal: No bilateral ankle edema, no clubbing or cyanosis to extremities   Psychiatric: Appropriate affect, cooperative   Neurologic: Oriented x 3, strength symmetric in all extremities, Cranial Nerves grossly intact to confrontation, speech clear   Skin: No rashes     Result Review    Result Review:  I have personally reviewed the results from the time of this admission to 5/9/2025 01:25 EDT and agree with these findings:  [x]  Laboratory list / accordion  []  Microbiology  [x]  Radiology  []  EKG/Telemetry   []   Cardiology/Vascular   []  Pathology  [x]  Old records  []  Other:    Initial lab work in the emergency department shows sodium of 135, glucose 109, WBCs 11.66.  All of the lab work is baseline for the patient.  Urinalysis shows no signs of infection.  CT abdomen pelvis with contrast shows dilated appendix with appendicoliths both the appendiceal base and distally within the appendix suggesting possible early acute appendicitis.  No abscess or bowel obstruction.      The 10-year ASCVD risk score (Lissette DK, et al., 2019) is: 2.1%    Values used to calculate the score:      Age: 49 years      Sex: Female      Is Non- : No      Diabetic: No      Tobacco smoker: No      Systolic Blood Pressure: 144 mmHg      Is BP treated: No      HDL Cholesterol: 56 mg/dL      Total Cholesterol: 251 mg/dL     Assessment & Plan   Assessment / Plan     Brief Patient Summary:  Flora Cruz is a 49 y.o. female who was admitted to the observation unit for further evaluation and treatment of her acute appendicitis.    Active Hospital Problems:  Active Hospital Problems    Diagnosis     **Acute appendicitis      Plan:     Acute appendicitis  -Vital signs every 4 hours  - Continuous pulse ox  - Zosyn 3.375 IV every 8 hours  - General Surgery consult in a.m.  - Strict n.p.o.  - Repeat labs in a.m.      VTE Prophylaxis:  Mechanical VTE prophylaxis orders are present.        CODE STATUS:    Code Status (Patient has no pulse and is not breathing): CPR (Attempt to Resuscitate)  Medical Interventions (Patient has pulse or is breathing): Full Support    Admission Status:  I believe this patient meets observation status.    75 minutes have been spent by [facility] providers in the care of this patient while under observation status on 05/09/25 .      Appropriate PPE worn during patient encounter.  Hand hygeine performed before and after seeing the patient.      Electronically signed by Natalia YOUSSEF  JAYLENE Zamorano, 05/09/25, 1:25 AM EDT.

## 2025-05-09 NOTE — PROGRESS NOTES
MD ATTESTATION NOTE      Brief HPI: Patient still complains of abdominal pain.  Pain is now more prominent in the right mid abdomen.  Denies fever, chills, nausea, or vomiting.    PHYSICAL EXAM    GENERAL: Awake, alert, and oriented x 3.  Resting comfortably in no acute distress  HENT: nares patent  EYES: no scleral icterus  CV: regular rhythm, normal rate  RESPIRATORY: normal effort, CTAB  ABDOMEN: soft  MUSCULOSKELETAL: no deformity, extremities are nontender  NEURO: moves all extremities, follows commands, speech is clear and fluent, no facial droop  PSYCH:  calm, cooperative  SKIN: warm, dry    Vital signs and nursing notes reviewed.        Plan: Patient remains afebrile.  White blood cell count has increased to 14.8.  General surgery has been consulted and is planning on performing an appendectomy later today.  Will continue IV Zosyn and IV Dilaudid

## 2025-05-09 NOTE — PROGRESS NOTES
ED OBSERVATION PROGRESS/DISCHARGE SUMMARY    Date of Admission: 5/8/2025   LOS: 0 days   PCP: Leelee Doe MD    Final Diagnosis acute appendicitis      Subjective     Hospital Outcome: Pending    Flora Cruz is a 49 y.o. female who was admitted to the ED observation unit for further evaluation of nausea, vomiting, right lower quadrant pain.  Pain began yesterday evening at roughly 8 PM.  No associated fever or chills.  Bowel movements said to be normal.  No UTI symptoms.  Patient had a CT scan of the abdomen pelvis with IV contrast that showed acute appendicitis.  General surgery following patient.  For now we are treating with IV Zosyn 3.375 mg every 8 hours and Dilaudid for pain control.    3:02 PM.  General surgery has evaluated.  Patient wished to proceed with laparoscopic and possible open appendectomy.    3:49 PM.  Patient taken to the OR.  Will transfer to Dr. Casarez's care at this time.    ROS:  General: no fevers, chills  Respiratory: no cough, dyspnea  Cardiovascular: no chest pain, palpitations  Abdomen: Abdominal pain  Neurologic: No focal weakness    Objective   Physical Exam:  I have reviewed the vital signs.  Temp:  [97.2 °F (36.2 °C)-99 °F (37.2 °C)] 99 °F (37.2 °C)  Heart Rate:  [] 112  Resp:  [18-20] 18  BP: (115-159)/() 123/81  General Appearance:    Alert, cooperative, no distress  Head:    Normocephalic, atraumatic  Eyes:    Sclerae anicteric  Neck:   Supple, no mass  Lungs: Clear to auscultation bilaterally, respirations unlabored  Heart: Regular rate and rhythm, S1 and S2 normal, no murmur, rub or gallop  Abdomen:  Soft, nontender, bowel sounds active, nondistended  Extremities: No clubbing, cyanosis, or edema to lower extremities  Pulses:  2+ and symmetric in distal lower extremities  Skin: No rashes   Neurologic: Oriented x3, Normal strength to extremities    Results Review:    I have reviewed the labs, radiology results and diagnostic studies.    Results from last 7 days    Lab Units 05/09/25  0335   WBC 10*3/mm3 14.83*   HEMOGLOBIN g/dL 14.0   HEMATOCRIT % 41.1   PLATELETS 10*3/mm3 253     Results from last 7 days   Lab Units 05/09/25  0335 05/08/25  2352   SODIUM mmol/L 137 135*   POTASSIUM mmol/L 3.7 3.5   CHLORIDE mmol/L 102 100   CO2 mmol/L 23.0 23.4   BUN mg/dL 8 9   CREATININE mg/dL 0.60 0.78   CALCIUM mg/dL 9.0 9.8   BILIRUBIN mg/dL  --  0.9   ALK PHOS U/L  --  69   ALT (SGPT) U/L  --  12   AST (SGOT) U/L  --  18   GLUCOSE mg/dL 136* 109*     Imaging Results (Last 24 Hours)       Procedure Component Value Units Date/Time    CT Abdomen Pelvis With Contrast [[id number]] Collected: 05/09/25 0053     Updated: 05/09/25 0103    Narrative:      CT  ABDOMEN & PELVIS WITH IV CONTRAST     HISTORY: upper abdominal pain, epigastric     TECHNIQUE:  CT of the abdomen and pelvis with  IV contrast. Coronal and sagittal  reconstructions were obtained.  Radiation dose reduction techniques were  utilized, including automated exposure control, and exposure modulation  based on body size.     COMPARISON:   None available.     FINDINGS:     Lung bases: Visualized lung bases are unremarkable.     Abdomen: The liver and gallbladder are normal.  The spleen and pancreas  appear normal.  Both adrenal glands are normal.  Both kidneys are  normal.  The aorta is normal in caliber.       There is no abdominal or retroperitoneal adenopathy or other mass.   There is no abdominal or retroperitoneal inflammatory change, or  abnormal fluid or air collection.  There is no evidence of bowel  obstruction.       Pelvis: The appendix is dilated, up to 12 mm in diameter. There are  appendicoliths both at the base of the appendix and of the distal  appendix. There is no abnormal fluid collection, free air or evidence of  bowel obstruction. There is trace pelvic ascites but no loculated fluid  collection is seen.     There is no acute bony abnormality.       Impression:         Dilated appendix with appendicoliths at  both the appendiceal base and  distally within the appendix, suggesting possible early acute  appendicitis. No abscess or bowel obstruction.              This report was finalized on 5/9/2025 1:00 AM by Dr. Osvaldo Ragland M.D  on Workstation: [id number]               I have reviewed the medications.     Discharge Medications        ASK your doctor about these medications        Instructions Start Date   albuterol sulfate  (90 Base) MCG/ACT inhaler  Commonly known as: PROVENTIL HFA;VENTOLIN HFA;PROAIR HFA   2 puffs, Every 4 Hours PRN      ascorbic acid 1000 MG tablet  Commonly known as: VITAMIN C   1,000 mg, Daily      ferrous sulfate 325 (65 FE) MG tablet   325 mg, Daily With Breakfast      FLUoxetine 10 MG capsule  Commonly known as: PROzac   30 mg, Oral, As Needed      hydrOXYzine 10 MG tablet  Commonly known as: ATARAX   10 mg, Nightly PRN      multivitamin with minerals tablet tablet   Take  by mouth.      OMEGA 3 PO   Take  by mouth.      tretinoin 0.025 % cream  Commonly known as: RETIN-A   1 Application, Nightly PRN              ---------------------------------------------------------------------------------------------  Assessment & Plan   Assessment/Problem List    Acute appendicitis      Plan:    Acute appendicitis  -Vital signs every 4 hours  - Continuous pulse ox  - Zosyn 3.375 IV every 8 hours  - Strict n.p.o.  - Repeat labs in a.m.  -Surgery following with plan above        VTE Prophylaxis:  Mechanical VTE prophylaxis orders are present.       Disposition: Pending    Follow-up after Discharge: Pending    This note will serve as a progress note    Lc Schmitt III, PA 05/09/25 15:50 EDT    I have worn appropriate PPE during this patient encounter, sanitized my hands both with entering and exiting patient's room.      42 minutes has been spent by [facility] providers in the care of this patient while under observation status on this date 05/09/25

## 2025-05-09 NOTE — ED PROVIDER NOTES
EMERGENCY DEPARTMENT ENCOUNTER  Room Number:  07/07  PCP: Leelee Doe MD  Independent Historians: Patient      HPI:  Chief Complaint: had concerns including Abdominal Pain.     A complete HPI/ROS/PMH/PSH/SH/FH are unobtainable due to: None    Chronic or social conditions impacting patient care (Social Determinants of Health): None      Context: Flora Cruz is a 49 y.o. female with a medical history of panic disorder presents emergency department today with sudden onset epigastric abdominal pain that occurred about 2 hours ago.  Patient says it started after landing from a flight from [place].  She says she ate [employer] and drink a smoothie in the airport prior to takeoff.  She was not having any abdominal discomfort and was not feeling unwell prior to the flight.  She reports 1 episode of vomiting.  She is still nauseated.  She denies diarrhea.  She is having normal bowel movements.  She denies fever or chills.    She says she has been having pain in her back which she thought may be her kidneys over the last several weeks. She denies urinary symptoms.  She denies a history of abdominal surgeries.  She denies a history of kidney stones.      Review of prior external notes (non-ED) -and- Review of prior external test results outside of this encounter:   (4/10/2025, hemoglobin 16.1, creatinine 0.73      PAST MEDICAL HISTORY  Active Ambulatory Problems     Diagnosis Date Noted    History of colon polyps 01/05/2022    Encounter for screening for malignant neoplasm of colon 01/05/2022     Resolved Ambulatory Problems     Diagnosis Date Noted    No Resolved Ambulatory Problems     Past Medical History:   Diagnosis Date    Chest pain     Dysphagia     Epigastric pain     History of panic attacks     PONV (postoperative nausea and vomiting)     Screening for colon cancer     Tubulovillous adenoma          PAST SURGICAL HISTORY  Past Surgical History:   Procedure Laterality Date    CERVICAL BIOPSY  W/ LOOP ELECTRODE  EXCISION      COLONOSCOPY  11/27/2018    SANTA gale M.D.    COLONOSCOPY N/A 3/8/2022    Procedure: COLONOSCOPY with Polypectomy;  Surgeon: Leandro Akbar MD;  [place] [facility] [place] [facility];  Service: Gastroenterology;  Laterality: N/A;  Hemorrhoids, Diverticulosis, Polyp    LEEP  2008    UPPER GASTROINTESTINAL ENDOSCOPY  11/27/2018    possible celiac - GIO Akbar     WISDOM TOOTH EXTRACTION           FAMILY HISTORY  Family History   Problem Relation Age of Onset    Diverticulitis Father     Colon polyps Father     Celiac disease Sister     Crohn's disease Sister     Colon polyps Sister     Colon cancer Maternal Grandmother     Colon cancer Paternal Grandmother     Cirrhosis Paternal Grandmother     Heart disease Mother     Colon polyps Mother     Colon polyps Brother     Colonic polyp Other     Hyperlipidemia Other     Hypertension Other          SOCIAL HISTORY  Social History     Socioeconomic History    Marital status:    Tobacco Use    Smoking status: Never    Smokeless tobacco: Never   Vaping Use    Vaping status: Never Used   Substance and Sexual Activity    Alcohol use: Not Currently     Alcohol/week: 1.0 standard drink of alcohol     Types: 1 Glasses of wine per week     Comment: social    Drug use: No    Sexual activity: Yes         ALLERGIES  Patient has no known allergies.      REVIEW OF SYSTEMS  Included in HPI  All systems reviewed and negative except for those discussed in HPI.      PHYSICAL EXAM    I have reviewed the triage vital signs and nursing notes.    ED Triage Vitals   Temp Heart Rate Resp BP SpO2   05/08/25 2337 05/08/25 2335 05/08/25 2335 05/08/25 2338 05/08/25 2335   98.7 °F (37.1 °C) 94 20 (!) 159/102 97 %      Temp src Heart Rate Source Patient Position BP Location FiO2 (%)   -- -- -- -- --              Physical Exam  Constitutional:       Appearance: Normal appearance. She is ill-appearing.   HENT:      Head: Normocephalic and atraumatic.      Nose: Nose normal.       Mouth/Throat:      Mouth: Mucous membranes are moist.   Eyes:      Extraocular Movements: Extraocular movements intact.      Pupils: Pupils are equal, round, and reactive to light.   Cardiovascular:      Rate and Rhythm: Normal rate and regular rhythm.      Pulses: Normal pulses.      Heart sounds: Normal heart sounds.   Pulmonary:      Effort: Pulmonary effort is normal. No respiratory distress.      Breath sounds: Normal breath sounds.   Abdominal:      General: Abdomen is flat. There is no distension.      Palpations: Abdomen is soft.      Tenderness: There is no abdominal tenderness.   Musculoskeletal:         General: Normal range of motion.      Cervical back: Normal range of motion and neck supple.   Skin:     General: Skin is warm and dry.      Capillary Refill: Capillary refill takes less than 2 seconds.   Neurological:      General: No focal deficit present.      Mental Status: She is alert and oriented to person, place, and time.   Psychiatric:         Mood and Affect: Mood normal.         Behavior: Behavior normal.         LAB RESULTS  Recent Results (from the past 24 hours)   Comprehensive Metabolic Panel    Collection Time: 05/08/25 11:52 PM    Specimen: Blood   Result Value Ref Range    Glucose 109 (H) 65 - 99 mg/dL    BUN 9 6 - 20 mg/dL    Creatinine 0.78 0.57 - 1.00 mg/dL    Sodium 135 (L) 136 - 145 mmol/L    Potassium 3.5 3.5 - 5.2 mmol/L    Chloride 100 98 - 107 mmol/L    CO2 23.4 22.0 - 29.0 mmol/L    Calcium 9.8 8.6 - 10.5 mg/dL    Total Protein 7.6 6.0 - 8.5 g/dL    Albumin 4.5 3.5 - 5.2 g/dL    ALT (SGPT) 12 1 - 33 U/L    AST (SGOT) 18 1 - 32 U/L    Alkaline Phosphatase 69 39 - 117 U/L    Total Bilirubin 0.9 0.0 - 1.2 mg/dL    Globulin 3.1 gm/dL    A/G Ratio 1.5 g/dL    BUN/Creatinine Ratio 11.5 7.0 - 25.0    Anion Gap 11.6 5.0 - 15.0 mmol/L    eGFR 92.7 >60.0 mL/min/1.73   Lipase    Collection Time: 05/08/25 11:52 PM    Specimen: Blood   Result Value Ref Range    Lipase 38 13 - 60 U/L    Lactic Acid, Plasma    Collection Time: 05/08/25 11:52 PM    Specimen: Blood   Result Value Ref Range    Lactate 1.3 0.5 - 2.0 mmol/L   hCG, Serum, Qualitative    Collection Time: 05/08/25 11:52 PM    Specimen: Blood   Result Value Ref Range    HCG Qualitative Negative Negative   Green Top (Gel)    Collection Time: 05/08/25 11:52 PM   Result Value Ref Range    Extra Tube Hold for add-ons.    Lavender Top    Collection Time: 05/08/25 11:52 PM   Result Value Ref Range    Extra Tube hold for add-on    Light Blue Top    Collection Time: 05/08/25 11:52 PM   Result Value Ref Range    Extra Tube Hold for add-ons.    CBC Auto Differential    Collection Time: 05/08/25 11:52 PM    Specimen: Blood   Result Value Ref Range    WBC 11.66 (H) 3.40 - 10.80 10*3/mm3    RBC 5.25 3.77 - 5.28 10*6/mm3    Hemoglobin 15.8 12.0 - 15.9 g/dL    Hematocrit 46.2 34.0 - 46.6 %    MCV 88.0 79.0 - 97.0 fL    MCH 30.1 26.6 - 33.0 pg    MCHC 34.2 31.5 - 35.7 g/dL    RDW 12.2 (L) 12.3 - 15.4 %    RDW-SD 38.6 37.0 - 54.0 fl    MPV 8.9 6.0 - 12.0 fL    Platelets 280 140 - 450 10*3/mm3    Neutrophil % 75.5 42.7 - 76.0 %    Lymphocyte % 16.5 (L) 19.6 - 45.3 %    Monocyte % 6.2 5.0 - 12.0 %    Eosinophil % 1.1 0.3 - 6.2 %    Basophil % 0.4 0.0 - 1.5 %    Immature Grans % 0.3 0.0 - 0.5 %    Neutrophils, Absolute 8.80 (H) 1.70 - 7.00 10*3/mm3    Lymphocytes, Absolute 1.92 0.70 - 3.10 10*3/mm3    Monocytes, Absolute 0.72 0.10 - 0.90 10*3/mm3    Eosinophils, Absolute 0.13 0.00 - 0.40 10*3/mm3    Basophils, Absolute 0.05 0.00 - 0.20 10*3/mm3    Immature Grans, Absolute 0.04 0.00 - 0.05 10*3/mm3    nRBC 0.0 0.0 - 0.2 /100 WBC   Urinalysis With Microscopic If Indicated (No Culture) - Urine, Clean Catch    Collection Time: 05/08/25 11:53 PM    Specimen: Urine, Clean Catch   Result Value Ref Range    Color, UA Yellow Yellow, Straw    Appearance, UA Clear Clear    pH, UA <=5.0 5.0 - 8.0    Specific Gravity, UA 1.024 1.005 - 1.030    Glucose, UA Negative  Negative    Ketones, UA 40 mg/dL (2+) (A) Negative    Bilirubin, UA Negative Negative    Blood, UA Negative Negative    Protein, UA Negative Negative    Leuk Esterase, UA Negative Negative    Nitrite, UA Negative Negative    Urobilinogen, UA 1.0 E.U./dL 0.2 - 1.0 E.U./dL         RADIOLOGY  CT Abdomen Pelvis With Contrast  Result Date: 5/9/2025  CT  ABDOMEN & PELVIS WITH IV CONTRAST  HISTORY: upper abdominal pain, epigastric  TECHNIQUE: CT of the abdomen and pelvis with  IV contrast. Coronal and sagittal reconstructions were obtained.  Radiation dose reduction techniques were utilized, including automated exposure control, and exposure modulation based on body size.  COMPARISON: None available.  FINDINGS:  Lung bases: Visualized lung bases are unremarkable.  Abdomen: The liver and gallbladder are normal.  The spleen and pancreas appear normal.  Both adrenal glands are normal.  Both kidneys are normal.  The aorta is normal in caliber.   There is no abdominal or retroperitoneal adenopathy or other mass. There is no abdominal or retroperitoneal inflammatory change, or abnormal fluid or air collection.  There is no evidence of bowel obstruction.   Pelvis: The appendix is dilated, up to 12 mm in diameter. There are appendicoliths both at the base of the appendix and of the distal appendix. There is no abnormal fluid collection, free air or evidence of bowel obstruction. There is trace pelvic ascites but no loculated fluid collection is seen.  There is no acute bony abnormality.       Dilated appendix with appendicoliths at both the appendiceal base and distally within the appendix, suggesting possible early acute appendicitis. No abscess or bowel obstruction.     This report was finalized on 5/9/2025 1:00 AM by Dr. Osvaldo Ragland M.D on Workstation: [id number]          MEDICATIONS GIVEN IN ER  Medications   sodium chloride 0.9 % flush 10 mL (has no administration in time range)   piperacillin-tazobactam (ZOSYN) 3.375  g IVPB in 100 mL NS MBP (CD) (3.375 g Intravenous New Bag 5/9/25 0131)   sodium chloride 0.9 % flush 10 mL (has no administration in time range)   sodium chloride 0.9 % flush 10 mL (has no administration in time range)   sodium chloride 0.9 % infusion 40 mL (has no administration in time range)   ondansetron ODT (ZOFRAN-ODT) disintegrating tablet 4 mg (has no administration in time range)     Or   ondansetron (ZOFRAN) injection 4 mg (has no administration in time range)   piperacillin-tazobactam (ZOSYN) 3.375 g IVPB in 100 mL NS MBP (CD) (has no administration in time range)   droperidol (INAPSINE) injection 1.25 mg (has no administration in time range)   HYDROmorphone (DILAUDID) injection 0.5 mg (0.5 mg Intravenous Given 5/9/25 0007)   ondansetron (ZOFRAN) injection 4 mg (4 mg Intravenous Given 5/9/25 0007)   lactated ringers bolus 1,000 mL (1,000 mL Intravenous New Bag 5/9/25 0008)   HYDROmorphone (DILAUDID) injection 0.5 mg (0.5 mg Intravenous Given 5/9/25 0046)   iopamidol (ISOVUE-300) 61 % injection 100 mL (85 mL Intravenous Given by Other 5/9/25 0038)   ondansetron (ZOFRAN) injection 4 mg (4 mg Intravenous Given 5/9/25 0055)           OUTPATIENT MEDICATION MANAGEMENT:  Current Facility-Administered Medications Ordered in Epic   Medication Dose Route Frequency Provider Last Rate Last Admin    droperidol (INAPSINE) injection 1.25 mg  1.25 mg Intravenous Once Anahi Solomon PA-C        ondansetron ODT (ZOFRAN-ODT) disintegrating tablet 4 mg  4 mg Oral Q6H PRN Jaun, Natalia S, APRN        Or    ondansetron (ZOFRAN) injection 4 mg  4 mg Intravenous Q6H PRN Jaun, Natalia S, APRN        piperacillin-tazobactam (ZOSYN) 3.375 g IVPB in 100 mL NS MBP (CD)  3.375 g Intravenous Once Anahi Solomon PA-C   3.375 g at 05/09/25 0131    piperacillin-tazobactam (ZOSYN) 3.375 g IVPB in 100 mL NS MBP (CD)  3.375 g Intravenous Q8H Natalia Zamorano, APRN        sodium chloride 0.9 % flush 10 mL  10 mL Intravenous PRN  Gera Srinivasan MD        sodium chloride 0.9 % flush 10 mL  10 mL Intravenous Q12H Natalia Zamorano APRN        sodium chloride 0.9 % flush 10 mL  10 mL Intravenous PRN Natalia Zamorano APRN        sodium chloride 0.9 % infusion 40 mL  40 mL Intravenous PRN Natalia Zamorano APRN         Current Outpatient Medications Ordered in Epic   Medication Sig Dispense Refill    ferrous sulfate 325 (65 FE) MG tablet Take 325 mg by mouth Daily With Breakfast.      FLUoxetine (PROzac) 10 MG capsule Take 30 mg by mouth Daily.      Multiple Vitamins-Minerals (MULTIVITAMIN ADULT PO) Take  by mouth.      Omega-3 Fatty Acids (OMEGA 3 PO) Take  by mouth.             PROGRESS, DATA ANALYSIS, CONSULTS, AND MEDICAL DECISION MAKING  ORDERS PLACED DURING THIS VISIT:  Orders Placed This Encounter   Procedures    CT Abdomen Pelvis With Contrast    [place] Draw    Comprehensive Metabolic Panel    Lipase    Urinalysis With Microscopic If Indicated (No Culture) - Urine, Clean Catch    Lactic Acid, Plasma    hCG, Serum, Qualitative    CBC Auto Differential    CBC (No Diff)    Basic Metabolic Panel    NPO Diet NPO Type: Strict NPO    Intake & Output    Weigh Patient    Oral Care    Saline Lock & Maintain IV Access    Vital Signs    Continuous Pulse Oximetry    Up With Assistance    Code Status and Medical Interventions: CPR (Attempt to Resuscitate); Full Support    Surgery (on-call MD unless specified)    Insert Peripheral IV    Insert Peripheral IV    Initiate Emergency Department Observation Status    CBC & Differential    Green Top (Gel)    Lavender Top    Light Blue Top       All labs have been independently interpreted by me.  All radiology studies have been reviewed by me. All EKG's have been independently viewed and interpreted by me.  Discussion below represents my analysis of pertinent findings related to patient's condition, differential diagnosis, treatment plan and final disposition.    Differential diagnosis includes but is not  limited to:   My differential diagnosis for abdominal pain includes but is not limited to:  Gastritis, gastroenteritis, peptic ulcer disease, GERD, esophageal perforation, acute appendicitis, mesenteric adenitis, Meckel’s diverticulum, epiploic appendagitis, diverticulitis, colon cancer, ulcerative colitis, Crohn’s disease, intussusception, small bowel obstruction, adhesions, ischemic bowel, perforated viscus, ileus, obstipation, biliary colic, cholecystitis, cholelithiasis, Ariel-Rigoberto Kaz, hepatitis, pancreatitis, common bile duct obstruction, cholangitis, bile leak, splenic trauma, splenic rupture, splenic infarction, splenic abscess, abdominal abscess, ascites, spontaneous bacterial peritonitis, hernia, UTI, cystitis, prostatitis, ureterolithiasis, urinary obstruction, ovarian cyst, torsion, pregnancy, ectopic pregnancy, PID, pelvic abscess, mittelschmerz, endometriosis, AAA, myocardial infarction, pneumonia, cancer, porphyria, DKA, medications, sickle cell, viral syndrome, zoster      ED Course:  ED Course as of 05/09/25 0131   Fri May 09, 2025   0000 I discussed the case with Dr. Srinivasan and he agrees to evaluate the patient at the bedside.    [CC]   0013 WBC(!): 11.66 [WH]   0013 Hemoglobin: 15.8 [WH]   0013 Ketones, UA(!): 40 mg/dL (2+) [WH]   0013 Leukocytes, UA: Negative [WH]   0013 Nitrite, UA: Negative [WH]   0038 ALT (SGPT): 12 [CC]   0038 AST (SGOT): 18 [CC]   0038 Alkaline Phosphatase: 69 [CC]   0044 CT Abdomen Pelvis With Contrast  My independent interpretation of the CT of the abdomen pelvis is no obvious gallstones, no gallbladder wall thickening [CC]   0118 I spoke with Dr. Smith regarding the patient's CT imaging.  He agrees with admission to observation [CC]   0120 I rechecked the patient.  I discussed the patient's labs, radiology findings (including all incidental findings), diagnosis, and plan for admission. The patient understands and agrees with the plan. [CC]   0120 Spoke with Natalia  APRN with Observation Unit.  Reviewed history, exam, results, treatments.  She agrees admit the patient.   [CC]      ED Course User Index  [CC] Anahi Solomon PA-C  [WH] Gera Srinivasan MD           AS OF 01:31 EDT VITALS:    BP - 144/83  HR - 56  TEMP - 98.7 °F (37.1 °C)  O2 SATS - 97%        MDM:  Patient is a generally healthy 49-year-old female who presents emergency department today  with acute severe upper abdominal pain.  On arrival here in the emergency department vitals are reassuring, she is afebrile.  On my exam the patient is tender to palpation in the epigastrium.  She appears to feel unwell.  Patient was evaluated with labs which revealed a mild leukocytosis.  Labs otherwise reassuring.  She was subsequent evaluated with a CT of the abdomen pelvis which showed acute appendicitis with appendicolith.  I spoke with general surgery who will see in consultation.  Will empirically treat with antibiotics.  Will plan to admit to the observation unit.  Patient is agreeable to this plan and is stable at time of admission.      COMPLEXITY OF CARE  The patient requires admission.        DIAGNOSIS  Final diagnoses:   Acute appendicitis with generalized peritonitis without gangrene, perforation, or abscess         DISPOSITION  ED Disposition       ED Disposition   Decision to Admit    Condition   --    Comment   --                    Please note that portions of this document were completed with a voice recognition program.    Note Disclaimer: At [facility], we believe that sharing information builds trust and better relationships. You are receiving this note because you recently visited [facility]. It is possible you will see health information before a provider has talked with you about it. This kind of information can be easy to misunderstand. To help you fully understand what it means for your health, we urge you to discuss this note with your provider.     Anahi Solomon,  HUGO  05/09/25 0132

## 2025-05-09 NOTE — ANESTHESIA PROCEDURE NOTES
Airway  Reason: elective    Date/Time: 5/9/2025 6:03 PM  Airway not difficult    General Information and Staff    Patient location during procedure: OR  CRNA/CAA: Marge Hernandez CRNA    Indications and Patient Condition  Indications for airway management: airway protection    Preoxygenated: yes    Mask difficulty assessment: 1 - vent by mask    Final Airway Details    Final airway type: endotracheal airway      Successful airway: ETT  Cuffed: yes   Successful intubation technique: direct laryngoscopy  Adjuncts used in placement: intubating stylet  Endotracheal tube insertion site: oral  Blade: Lisandra  Blade size: 3  ETT size (mm): 7.0  Cormack-Lehane Classification: grade I - full view of glottis  Placement verified by: chest auscultation and capnometry   Measured from: lips  Number of attempts at approach: 1  Assessment: lips, teeth, and gum same as pre-op and atraumatic intubation

## 2025-05-09 NOTE — CONSULTS
General Surgery Consultation    Consulting Physician: Loraine Casarez MD  Referring: Anahi Solomon PA-C     Reason for consultation:   appendicitis    CC:   Abdominal pain    HPI:   The patient is a 49 y.o. female who presented to the hospital with 1 day history of abdominal pain.  She states she just flew back from [place] visiting her child and started having pain after she got off the airplane around 9 PM.  It was at her periumbilical region and also in her right lower quadrant.  She had associated nausea and nonbloody vomiting.  She denies fever, chills, constipation, diarrhea, blood in her stool, or other symptoms.  She was admitted to the hospital and started on Zosyn after being found to have acute appendicitis on ED workup.    Past Medical History:  Postoperative nausea vomiting  History of colon polyps    Past Surgical History:  She reports a colonoscopy and EGD about 4 years ago and has a history of colon polyps.      LEEP  [place] tooth extraction    Medications:  Medications Prior to Admission   Medication Sig Dispense Refill Last Dose/Taking    ascorbic acid (VITAMIN C) 1000 MG tablet Take 1 tablet by mouth Daily.   Past Week    ferrous sulfate 325 (65 FE) MG tablet Take 1 tablet by mouth Daily With Breakfast.   Past Week    Multiple Vitamins-Minerals (MULTIVITAMIN ADULT PO) Take  by mouth.   5/8/2025    Omega-3 Fatty Acids (OMEGA 3 PO) Take  by mouth.   5/8/2025    albuterol sulfate  (90 Base) MCG/ACT inhaler Inhale 2 puffs Every 4 (Four) Hours As Needed.   Unknown    FLUoxetine (PROzac) 10 MG capsule Take 3 capsules by mouth As Needed.   Unknown    hydrOXYzine (ATARAX) 10 MG tablet Take 1 tablet by mouth At Night As Needed.   Unknown    tretinoin (RETIN-A) 0.025 % cream Apply 1 Application topically to the appropriate area as directed At Night As Needed.   Unknown       Current Facility-Administered Medications:     HYDROmorphone (DILAUDID) injection 0.5 mg, 0.5 mg, Intravenous, Q4H  PRN, Jaun, Natalia S, APRN, 0.5 mg at 05/09/25 0756    ondansetron ODT (ZOFRAN-ODT) disintegrating tablet 4 mg, 4 mg, Oral, Q6H PRN **OR** ondansetron (ZOFRAN) injection 4 mg, 4 mg, Intravenous, Q6H PRN, Jaun, Natalia S, APRN, 4 mg at 05/09/25 1021    piperacillin-tazobactam (ZOSYN) 3.375 g IVPB in 100 mL NS MBP (CD), 3.375 g, Intravenous, Q8H, Jaun, Natalia S, APRN, 3.375 g at 05/09/25 0756    sodium chloride 0.9 % flush 10 mL, 10 mL, Intravenous, PRN, Gera Srinivasan MD    sodium chloride 0.9 % flush 10 mL, 10 mL, Intravenous, Q12H, Jaun, Natalia S, APRN, 10 mL at 05/09/25 0941    sodium chloride 0.9 % flush 10 mL, 10 mL, Intravenous, PRN, Jaun, Natalia S, APRN    sodium chloride 0.9 % infusion 40 mL, 40 mL, Intravenous, PRN, Jaun, Natalia S, APRN    Allergies:   No Known Allergies    Social History:   Social History     Socioeconomic History    Marital status:    Tobacco Use    Smoking status: Never    Smokeless tobacco: Never   Vaping Use    Vaping status: Never Used   Substance and Sexual Activity    Alcohol use: Not Currently     Alcohol/week: 1.0 standard drink of alcohol     Types: 1 Glasses of wine per week     Comment: social    Drug use: No    Sexual activity: Yes       Family History:   Brother has colon cancer  Multiple family members with colon polyps  Sister has Crohn's disease  Father had diverticulitis    Review of Systems:  Constitutional: denies any weight changes, fatigue, or weakness; no fever or chills  Eyes: denies vision changes, scleral icterus  Cardiovascular: denies chest pain or palpitations   Respiratory: denies cough or shortness of breath  Gastrointestinal:  + abdominal pain, +nausea, +vomiting, denies diarrhea, constipation, melena, hematochezia  Genitourinary: denies dysuria or hematuria  Musculoskeletal: denies weakness  Neurologic: denies headache, weakness, or numbness  Skin: denies rash or jaundice     Physical Exam:   Vitals:    05/09/25 1108   BP: 123/81   Pulse: 85    Resp: 18   Temp: 97.2 °F (36.2 °C)   SpO2: 100%     GENERAL: alert, interactive, cooperative  HEENT: no scleral icterus; moist mucous membranes  NECK: Supple  RESPIRATORY: normal work of breathing   CARDIOVASCULAR: RRR  GASTROINTESTINAL: abdomen soft, mildly distended, tender in the mid abdomen and RLQ with focal guarding, no rebound  MUSCULOSKELETAL: no cyanosis or edema   NEUROLOGIC: alert and oriented, normal speech, no gross focal deficits   SKIN: warm, no rash, no jaundice      Diagnostic workup:     Pertinent labs:   Results from last 7 days   Lab Units 05/09/25  0335 05/08/25  2352   WBC 10*3/mm3 14.83* 11.66*   HEMOGLOBIN g/dL 14.0 15.8   HEMATOCRIT % 41.1 46.2   PLATELETS 10*3/mm3 253 280     Results from last 7 days   Lab Units 05/09/25  0335 05/08/25  2352   SODIUM mmol/L 137 135*   POTASSIUM mmol/L 3.7 3.5   CHLORIDE mmol/L 102 100   CO2 mmol/L 23.0 23.4   BUN mg/dL 8 9   CREATININE mg/dL 0.60 0.78   CALCIUM mg/dL 9.0 9.8   BILIRUBIN mg/dL  --  0.9   ALK PHOS U/L  --  69   ALT (SGPT) U/L  --  12   AST (SGOT) U/L  --  18   GLUCOSE mg/dL 136* 109*     Urinalysis positive for 2+ ketones, negative for infection  Lipase 38  Lactate 1.3    Imaging:  CT abdomen and pelvis 5/9/2025 images and report reviewed-there is a dilated appendix with appendicoliths at the base and distally without evidence of perforation or abscess    Assessment and plan:   Acute appendicitis    I discussed with the patient the risks and benefits of nonoperative management with IV antibiotics and drainage for development of abscess and interval appendectomy vs. immediate laparoscopic appendectomy. Risks, benefits, alternatives, and postoperative expectations were discussed. Risks include  bleeding; infection requiring antibiotics and drainage procedures; injury to surrounding structures including colon, small bowel, ureter, other intraabdominal organs; risk of need for ileocectomy.  All questions were answered and informed consent  obtained. At this time patient wishes to proceed with laparoscopic possible open appendectomy, possible ileocecectomy.      Loraine Casarez M.D.  General, Robotic, and Endoscopic Surgery  [facility]    [address], [address]  [place], [place], [address]  P: [phone]  F: [phone]

## 2025-05-09 NOTE — ANESTHESIA PREPROCEDURE EVALUATION
Anesthesia Evaluation     Patient summary reviewed and Nursing notes reviewed   history of anesthetic complications:  PONV  NPO Solid Status: > 8 hours  NPO Liquid Status: > 2 hours           Airway   Mallampati: II  TM distance: >3 FB  Neck ROM: full  Dental      Pulmonary - negative pulmonary ROS   Cardiovascular       ROS comment: H/o tachycardia. Patient says she saw cardiologist previously, workup normal, attributted to anxiety    Neuro/Psych  (+) psychiatric history Anxiety  GI/Hepatic/Renal/Endo      ROS Comment: Acute appendicitis    Musculoskeletal (-) negative ROS    Abdominal    Substance History - negative use     OB/GYN          Other - negative ROS                     Anesthesia Plan    ASA 2 - emergent     general   Rapid sequence  intravenous induction     Anesthetic plan, risks, benefits, and alternatives have been provided, discussed and informed consent has been obtained with: patient.      CODE STATUS:    Code Status (Patient has no pulse and is not breathing): CPR (Attempt to Resuscitate)  Medical Interventions (Patient has pulse or is breathing): Full Support

## 2025-05-09 NOTE — ED PROVIDER NOTES
MD ATTESTATION NOTE      Brief HPI: Patient complains of acute epigastric pain that began several hours ago.  Pain is sharp and constant.  Nothing makes the pain better or worse.  She has had associated nausea and 1 episode of vomiting.  Denies fever, chills, chest pain, shortness of breath, diarrhea, constipation, flank pain, or dysuria.  She has not had prior abdominal surgery.    PHYSICAL EXAM    GENERAL: Awake, alert, and oriented x 3.  Well-developed, well-nourished and nontoxic-appearing female.  She appears uncomfortable.  HENT: nares patent  EYES: no scleral icterus  CV: regular rhythm, normal rate  RESPIRATORY: normal effort, CTAB  ABDOMEN: soft, nondistended, there is epigastric tenderness without rebound or guarding, no CVA tenderness  MUSCULOSKELETAL: no deformity, extremities are nontender  NEURO: moves all extremities, follows commands, speech is clear and fluent, no facial droop  PSYCH:  calm, cooperative  SKIN: warm, dry    Vital signs and nursing notes reviewed.        Plan: Patient complains of acute epigastric pain, nausea, and vomiting.  She has some epigastric tenderness but does not have an acute abdomen.  Will obtain labs and CT scan for further evaluation.  She has been given Zofran and Dilaudid and started on IV fluids.    Differential diagnosis includes but is not limited to: Gastritis, peptic ulcer disease, pancreatitis, cholecystitis, bowel obstruction, bowel perforation    CT abdomen/pelvis personally interpreted by me.  My personal interpretation is: No bowel obstruction.  No obstructive uropathy.  There is an appendicolith.    ED Course as of 05/09/25 0139   Fri May 09, 2025   0000 I discussed the case with Dr. Srinivasan and he agrees to evaluate the patient at the bedside.    [CC]   0013 WBC(!): 11.66 [WH]   0013 Hemoglobin: 15.8 [WH]   0013 Ketones, UA(!): 40 mg/dL (2+) [WH]   0013 Leukocytes, UA: Negative [WH]   0013 Nitrite, UA: Negative [WH]   0038 ALT (SGPT): 12 [CC]   0038 AST  (SGOT): 18 [CC]   0038 Alkaline Phosphatase: 69 [CC]   0044 CT Abdomen Pelvis With Contrast  My independent interpretation of the CT of the abdomen pelvis is no obvious gallstones, no gallbladder wall thickening [CC]   0118 I spoke with Dr. Smith regarding the patient's CT imaging.  He agrees with admission to observation [CC]   0120 I rechecked the patient.  I discussed the patient's labs, radiology findings (including all incidental findings), diagnosis, and plan for admission. The patient understands and agrees with the plan. [CC]   0120 Spoke with JAYLENE Fisher with Observation Unit.  Reviewed history, exam, results, treatments.  She agrees admit the patient.   [CC]      ED Course User Index  [CC] Anahi Solomon, PA-C  [WH] Gera Srinivasan MD Holland, William D, MD  05/09/25 0139

## 2025-05-10 VITALS
WEIGHT: 145 LBS | RESPIRATION RATE: 18 BRPM | BODY MASS INDEX: 23.3 KG/M2 | HEART RATE: 77 BPM | TEMPERATURE: 98.2 F | DIASTOLIC BLOOD PRESSURE: 70 MMHG | OXYGEN SATURATION: 98 % | SYSTOLIC BLOOD PRESSURE: 112 MMHG | HEIGHT: 66 IN

## 2025-05-10 LAB
BASOPHILS # BLD AUTO: 0.02 10*3/MM3 (ref 0–0.2)
BASOPHILS NFR BLD AUTO: 0.1 % (ref 0–1.5)
DEPRECATED RDW RBC AUTO: 40.4 FL (ref 37–54)
EOSINOPHIL # BLD AUTO: 0 10*3/MM3 (ref 0–0.4)
EOSINOPHIL NFR BLD AUTO: 0 % (ref 0.3–6.2)
ERYTHROCYTE [DISTWIDTH] IN BLOOD BY AUTOMATED COUNT: 12.2 % (ref 12.3–15.4)
HCT VFR BLD AUTO: 37.6 % (ref 34–46.6)
HGB BLD-MCNC: 13 G/DL (ref 12–15.9)
IMM GRANULOCYTES # BLD AUTO: 0.06 10*3/MM3 (ref 0–0.05)
IMM GRANULOCYTES NFR BLD AUTO: 0.4 % (ref 0–0.5)
LYMPHOCYTES # BLD AUTO: 1.02 10*3/MM3 (ref 0.7–3.1)
LYMPHOCYTES NFR BLD AUTO: 6.6 % (ref 19.6–45.3)
MCH RBC QN AUTO: 31 PG (ref 26.6–33)
MCHC RBC AUTO-ENTMCNC: 34.6 G/DL (ref 31.5–35.7)
MCV RBC AUTO: 89.7 FL (ref 79–97)
MONOCYTES # BLD AUTO: 1.03 10*3/MM3 (ref 0.1–0.9)
MONOCYTES NFR BLD AUTO: 6.7 % (ref 5–12)
NEUTROPHILS NFR BLD AUTO: 13.21 10*3/MM3 (ref 1.7–7)
NEUTROPHILS NFR BLD AUTO: 86.2 % (ref 42.7–76)
NRBC BLD AUTO-RTO: 0 /100 WBC (ref 0–0.2)
PLATELET # BLD AUTO: 221 10*3/MM3 (ref 140–450)
PMV BLD AUTO: 9.2 FL (ref 6–12)
RBC # BLD AUTO: 4.19 10*6/MM3 (ref 3.77–5.28)
WBC NRBC COR # BLD AUTO: 15.34 10*3/MM3 (ref 3.4–10.8)

## 2025-05-10 PROCEDURE — 25010000002 PIPERACILLIN SOD-TAZOBACTAM PER 1 G: Performed by: STUDENT IN AN ORGANIZED HEALTH CARE EDUCATION/TRAINING PROGRAM

## 2025-05-10 PROCEDURE — 85025 COMPLETE CBC W/AUTO DIFF WBC: CPT | Performed by: STUDENT IN AN ORGANIZED HEALTH CARE EDUCATION/TRAINING PROGRAM

## 2025-05-10 PROCEDURE — 99024 POSTOP FOLLOW-UP VISIT: CPT | Performed by: STUDENT IN AN ORGANIZED HEALTH CARE EDUCATION/TRAINING PROGRAM

## 2025-05-10 PROCEDURE — G0378 HOSPITAL OBSERVATION PER HR: HCPCS

## 2025-05-10 RX ORDER — POLYETHYLENE GLYCOL 3350 17 G/17G
17 POWDER, FOR SOLUTION ORAL DAILY
Qty: 510 G | Refills: 0 | Status: SHIPPED | OUTPATIENT
Start: 2025-05-10 | End: 2025-06-09

## 2025-05-10 RX ADMIN — HYDROCODONE BITARTRATE AND ACETAMINOPHEN 1 TABLET: 5; 325 TABLET ORAL at 09:23

## 2025-05-10 RX ADMIN — HYDROCODONE BITARTRATE AND ACETAMINOPHEN 1 TABLET: 5; 325 TABLET ORAL at 14:04

## 2025-05-10 RX ADMIN — Medication 1 TABLET: at 08:54

## 2025-05-10 RX ADMIN — PIPERACILLIN AND TAZOBACTAM 3.38 G: 3; .375 INJECTION, POWDER, FOR SOLUTION INTRAVENOUS at 08:54

## 2025-05-10 RX ADMIN — FERROUS SULFATE TAB 325 MG (65 MG ELEMENTAL FE) 325 MG: 325 (65 FE) TAB at 08:54

## 2025-05-10 RX ADMIN — OXYCODONE HYDROCHLORIDE AND ACETAMINOPHEN 1000 MG: 500 TABLET ORAL at 08:54

## 2025-05-10 RX ADMIN — ACETAMINOPHEN 1000 MG: 500 TABLET, FILM COATED ORAL at 06:23

## 2025-05-10 RX ADMIN — Medication 10 ML: at 09:24

## 2025-05-10 RX ADMIN — PIPERACILLIN AND TAZOBACTAM 3.38 G: 3; .375 INJECTION, POWDER, FOR SOLUTION INTRAVENOUS at 00:34

## 2025-05-10 NOTE — DISCHARGE SUMMARY
General Surgery Discharge Summary    Patient name:   Flora Cruz    Medical record number:   [id number]    Admission date:   5/8/2025    Discharge date:    5/10/25    Attending physician:   Loraine Casarez MD    Primary care physician:   Leelee Doe MD    Referring physician:   Gera Srinivasan MD    Condition on discharge:   improving    Diagnosis:  Acute gangrenous appendicitis    Operative Procedure:   Laparoscopic appendectomy    Hospital Course:   The patient is a 49 y.o. female who was admitted to the hospital with abdominal pain.  She was found on workup to have acute appendicitis and was recommended to undergo surgery.  She underwent laparoscopic appendectomy on 5/9/2025 by Dr. Casarez.  She was found to have acute gangrenous appendicitis without rupture or abscess.  She was admitted for overnight observation on IV Zosyn.  Postop day 1 she is feeling much better, tolerating a diet without nausea, having good pain control, passing gas, and ambulatory.     On exam her abdomen is mildly distended but appropriately tender postoperatively with well-healing incisions.    She is stable for discharge home to follow-up with Dr. Casarez in the office in 2 weeks.  She was counseled about signs and symptoms concerning for need to call the office or return to the ER.  She was also counseled about pain control, wound care, diet, activity restrictions, and follow-up.  Patient agreeable with recommendations.    Discharge medications:      Discharge Medications        New Medications        Instructions Start Date   HYDROcodone-acetaminophen 5-325 MG per tablet  Commonly known as: NORCO   1 tablet, Oral, Every 6 Hours PRN      ondansetron ODT 4 MG disintegrating tablet  Commonly known as: ZOFRAN-ODT   4 mg, Translingual, Every 8 Hours PRN      polyethylene glycol 17 g packet  Commonly known as: MIRALAX   17 g, Oral, Daily             Continue These Medications        Instructions Start Date   albuterol sulfate   (90 Base) MCG/ACT inhaler  Commonly known as: PROVENTIL HFA;VENTOLIN HFA;PROAIR HFA   2 puffs, Every 4 Hours PRN      ascorbic acid 1000 MG tablet  Commonly known as: VITAMIN C   1,000 mg, Daily      ferrous sulfate 325 (65 FE) MG tablet   325 mg, Daily With Breakfast      FLUoxetine 10 MG capsule  Commonly known as: PROzac   30 mg, Oral, As Needed      hydrOXYzine 10 MG tablet  Commonly known as: ATARAX   10 mg, Nightly PRN      multivitamin with minerals tablet tablet   Take  by mouth.      OMEGA 3 PO   Take  by mouth.      tretinoin 0.025 % cream  Commonly known as: RETIN-A   1 Application, Nightly PRN               Discharge instructions:    Discharge Instructions: Laparoscopic Appendectomy     Go home, rest and take it easy today; however, you should get up and move about several times today to reduce the risk of developing a blood clot in your legs.   You may experience some dizziness or memory loss from the anesthesia. This may last for the next 24 hours. Someone should plan on staying with you for the first 24 hours for your safety.   Do not make any important legal decisions or sign any legal papers for the next 24 hours.   Eat and drink lightly today. Start off with liquids, jello, soup, crackers or other bland foods at first. You may advance your diet tomorrow as tolerated as long as you do not experience any nausea or vomiting.   Your incisions are covered with skin glue. Do not try to peel it off. This will fall off on its own in 1-2 weeks. Do not worry if it comes off sooner.   You may notice some bleeding/drainage on your outer dressings. A little bloody drainage is normal. If the bleeding/drainage is such that the bandage cannot absorb it, remove the dressing, apply clean gauze and apply firm pressure for a full 15 minutes. If the bleeding continues, please call me.   You may shower 24 hours after surgery. No tub baths or swimming for two weeks and until your incisions are completely healed.   You  have received a prescription for a narcotic pain medicine, as you will have some pain following surgery.  You will not be totally pain free, but your pain medicine should make the pain tolerable. Please take your pain medicine as prescribed and always take your pills with food to prevent nausea. If you are having severe pain that cannot be controlled by the pain medicine, please contact me.   Narcotic pain medicine can cause constipation. Take an over the counter stool softener, like Miaralax or docusate to prevent constipation while taking narcotics.  You have also received a prescription for an anti-nausea medicine. Please take this as prescribed for any nausea or vomiting. Nausea could be a result of the anesthesia or a result of the narcotic pain medicine. If you experience severe nausea and vomiting that cannot be controlled by the nausea medicine, please call me.   It is not unusual to experience pain/discomfort in your shoulders or your ribs after surgery. It is from the gas used during the laparoscopic procedure and usually lasts 1-3 days. The prescription pain medicine is used to treat the surgical pain and does not typically alleviate this “gassy” pain.   No driving for 24 hours and for as long as you are taking your prescription pain medicine.   You will need to call the office at [phone] to schedule a follow-up appointment in 10-14 days.   Remember to contact me for any of the following:   Fever greater than 101 degrees fahrenheit  Severe pain that cannot be controlled by taking your pain pills  Severe nausea or vomiting that cannot be controlled by taking your nausea pills  Significant bleeding of your incisions  Drainage that has a bad smell or is yellow or green in appearance  Any other questions or concerns     Follow-up appointment:   Follow up with Loraine Casarez MD in the office in 2 weeks. Call for appointment at [phone].        Loraine Casarez M.D.  General, Robotic, and  Endoscopic Surgery  [facility]    [address], [address]  [place], [place], [address]  P: [phone]  F: [phone]

## 2025-05-10 NOTE — DISCHARGE INSTRUCTIONS
Discharge Instructions: Laparoscopic Appendectomy     Go home, rest and take it easy today; however, you should get up and move about several times today to reduce the risk of developing a blood clot in your legs.   You may experience some dizziness or memory loss from the anesthesia. This may last for the next 24 hours. Someone should plan on staying with you for the first 24 hours for your safety.   Do not make any important legal decisions or sign any legal papers for the next 24 hours.   Eat and drink lightly today. Start off with liquids, jello, soup, crackers or other bland foods at first. You may advance your diet tomorrow as tolerated as long as you do not experience any nausea or vomiting.   Your incisions are covered with skin glue. Do not try to peel it off. This will fall off on its own in 1-2 weeks. Do not worry if it comes off sooner.   You may notice some bleeding/drainage on your outer dressings. A little bloody drainage is normal. If the bleeding/drainage is such that the bandage cannot absorb it, remove the dressing, apply clean gauze and apply firm pressure for a full 15 minutes. If the bleeding continues, please call me.   You may shower 24 hours after surgery. No tub baths or swimming for two weeks and until your incisions are completely healed.   You have received a prescription for a narcotic pain medicine, as you will have some pain following surgery.  You will not be totally pain free, but your pain medicine should make the pain tolerable. Please take your pain medicine as prescribed and always take your pills with food to prevent nausea. If you are having severe pain that cannot be controlled by the pain medicine, please contact me.   Narcotic pain medicine can cause constipation. Take an over the counter stool softener, like Miaralax or docusate to prevent constipation while taking narcotics.  You have also received a prescription for an anti-nausea medicine. Please take this as prescribed  for any nausea or vomiting. Nausea could be a result of the anesthesia or a result of the narcotic pain medicine. If you experience severe nausea and vomiting that cannot be controlled by the nausea medicine, please call me.   It is not unusual to experience pain/discomfort in your shoulders or your ribs after surgery. It is from the gas used during the laparoscopic procedure and usually lasts 1-3 days. The prescription pain medicine is used to treat the surgical pain and does not typically alleviate this “gassy” pain.   No driving for 24 hours and for as long as you are taking your prescription pain medicine.   You will need to call the office at [phone] to schedule a follow-up appointment in 10-14 days.   Remember to contact me for any of the following:   Fever greater than 101 degrees fahrenheit  Severe pain that cannot be controlled by taking your pain pills  Severe nausea or vomiting that cannot be controlled by taking your nausea pills  Significant bleeding of your incisions  Drainage that has a bad smell or is yellow or green in appearance  Any other questions or concerns

## 2025-05-10 NOTE — ANESTHESIA POSTPROCEDURE EVALUATION
Patient: Flora Cruz    Procedure Summary       Date: 05/09/25 Room / Location: [facility] OR [address] [place] [address] MAIN OR    Anesthesia Start: 1758 Anesthesia Stop: 1844    Procedure: APPENDECTOMY LAPAROSCOPIC (Abdomen) Diagnosis:     Surgeons: Loraine Casarez MD Provider: Darek Brown DO    Anesthesia Type: general ASA Status: 2 - Emergent            Anesthesia Type: general    Vitals  Vitals Value Taken Time   /70 05/09/25 20:00   Temp 36.5 °C (97.7 °F) 05/09/25 20:00   Pulse 86 05/09/25 20:07   Resp 16 05/09/25 20:00   SpO2 99 % 05/09/25 20:07   Vitals shown include unfiled device data.        Post Anesthesia Care and Evaluation      Comments: No anesthesia complications were reported to me

## 2025-05-10 NOTE — PLAN OF CARE
Problem: Adult Inpatient Plan of Care  Goal: Plan of Care Review  Outcome: Progressing  Flowsheets (Taken 5/10/2025 1734)  Progress: improving  Outcome Evaluation: received from PACU alert and oriented, pain 4/10 most dull ache generalized lower abdomen, lap sites x3 with skin gle open to air, post op plan of care explained, tolerating Regular diet, on scheduled Tylenol, ambulated x3 throughout the night, voiding, IS up to 2500, on Iv Zosyn, VSS  Plan of Care Reviewed With: patient   Goal Outcome Evaluation:  Plan of Care Reviewed With: patient        Progress: improving  Outcome Evaluation: received from PACU alert and oriented, pain 4/10 most dull ache generalized lower abdomen, lap sites x3 with skin gle open to air, post op plan of care explained, tolerating Regular diet, on scheduled Tylenol, ambulated x3 throughout the night, voiding, IS up to 2500, on Iv Zosyn, VSS

## 2025-05-12 LAB
CYTO UR: NORMAL
LAB AP CASE REPORT: NORMAL
PATH REPORT.FINAL DX SPEC: NORMAL
PATH REPORT.GROSS SPEC: NORMAL

## 2025-05-12 NOTE — PROGRESS NOTES
Case Management Discharge Note      Final Note: Discharged home. Carina Booker RN         Selected Continued Care - Discharged on 5/10/2025 Admission date: 5/8/2025 - Discharge disposition: Home or Self Care         Transportation Services  Private: Car    Final Discharge Disposition Code: 01 - home or self-care

## 2025-05-13 ENCOUNTER — LAB (OUTPATIENT)
Dept: LAB | Facility: HOSPITAL | Age: 50
End: 2025-05-13
Payer: COMMERCIAL

## 2025-05-13 ENCOUNTER — TELEPHONE (OUTPATIENT)
Dept: SURGERY | Facility: CLINIC | Age: 50
End: 2025-05-13
Payer: COMMERCIAL

## 2025-05-13 DIAGNOSIS — R30.0 DYSURIA: Primary | ICD-10-CM

## 2025-05-13 DIAGNOSIS — R30.0 DYSURIA: ICD-10-CM

## 2025-05-13 LAB
BILIRUB UR QL STRIP: NEGATIVE
CLARITY UR: CLEAR
COLOR UR: YELLOW
GLUCOSE UR STRIP-MCNC: NEGATIVE MG/DL
HGB UR QL STRIP.AUTO: NEGATIVE
KETONES UR QL STRIP: NEGATIVE
LEUKOCYTE ESTERASE UR QL STRIP.AUTO: NEGATIVE
NITRITE UR QL STRIP: NEGATIVE
PH UR STRIP.AUTO: 6 [PH] (ref 5–8)
PROT UR QL STRIP: NEGATIVE
SP GR UR STRIP: 1.01 (ref 1–1.03)
UROBILINOGEN UR QL STRIP: NORMAL

## 2025-05-13 PROCEDURE — 81003 URINALYSIS AUTO W/O SCOPE: CPT

## 2025-05-13 NOTE — TELEPHONE ENCOUNTER
Called patient back about her symptoms.  She states she has been having intermittent left lower back pain which is not present at rest and worsens when she moves.  She states that it is better with NSAIDs but that when these wear off it returns.  She states that in the past several weeks she has had ongoing workup with her PCP for what she refers to as kidney pain.  She does not have any abdominal tenderness aside from her incisional pain but she does state that she is having some burning with urination, and she feels dehydrated and tired, with headaches in the morning and heart racing when her pain is worse.  I recommend urinalysis to evaluate for UTI.  I explained to her that her symptoms do not sound related to her recent appendectomy but that if she has worsening pain, fever, or other symptoms that she should go to the emergency department to be evaluated.  Patient is agreeable.    Loraine Casarez M.D.  General, Robotic, and Endoscopic Surgery  [facility]    [address], [address]  [place], [place], [address]  P: [phone]  F: [phone]

## 2025-05-13 NOTE — TELEPHONE ENCOUNTER
Voice mail for patient's , Leonardo,  reporting Flora's is worse in her back.  He is requesting a return call.     S/P Laparoscopic appendectomy  05/09/25

## 2025-05-13 NOTE — TELEPHONE ENCOUNTER
Returned call to patient.  She is reporting pain below her left rib cage in the back.  She states the pain is sharp/stabbing in nature, not constant, and is relieved by tylenol or advil.  This pain mostly occurs with walking and any movement.  She denies, fever, nausea, and vomiting.  She does report feeling light headed and dizzy.   She has tried ice pack with some relief.

## 2025-05-20 ENCOUNTER — RESULTS FOLLOW-UP (OUTPATIENT)
Dept: SURGERY | Facility: CLINIC | Age: 50
End: 2025-05-20
Payer: COMMERCIAL

## 2025-05-22 ENCOUNTER — OFFICE VISIT (OUTPATIENT)
Dept: SURGERY | Facility: CLINIC | Age: 50
End: 2025-05-22
Payer: COMMERCIAL

## 2025-05-22 VITALS
TEMPERATURE: 97.9 F | WEIGHT: 147.2 LBS | OXYGEN SATURATION: 99 % | HEIGHT: 66 IN | HEART RATE: 99 BPM | SYSTOLIC BLOOD PRESSURE: 118 MMHG | BODY MASS INDEX: 23.66 KG/M2 | DIASTOLIC BLOOD PRESSURE: 84 MMHG

## 2025-05-22 DIAGNOSIS — Z90.49 STATUS POST LAPAROSCOPIC APPENDECTOMY: Primary | ICD-10-CM

## 2025-05-22 PROCEDURE — 99024 POSTOP FOLLOW-UP VISIT: CPT | Performed by: STUDENT IN AN ORGANIZED HEALTH CARE EDUCATION/TRAINING PROGRAM

## 2025-05-22 NOTE — PROGRESS NOTES
General Surgery Office Follow Up Note     History of Present Illness:    Flora Cruz is a 49 y.o. female who presents for follow up s/p laparoscopic appendectomy on 5/9/25.    Pathology:  Final Diagnosis   1.  Appendix, appendectomy:               A.  Acute appendicitis, periappendicitis, and serositis.               B.  Benign peritoneal inclusion cysts.               C.  Single benign lymph node.     Patient denies fever, chills, nausea, vomiting, diarrhea, constipation.  She is on her period and thinks she may have had rectal bleeding but is not sure. Denies pain. Denies incisional problems.  She has had some left flank pain worse with movements but this is getting better slowly over time.        Past Medical History:  Past Medical History:   Diagnosis Date    Anemia 10/2021    Low ferritin    Chest pain     Colon polyp 11/2018    Coronary artery disease 2020    Tachycardia/    Diverticulitis of colon 2018    Diverticulosis    Dysphagia     Epigastric pain     GERD (gastroesophageal reflux disease) 2018    Hernia 2014    Supposed to have surgery 2022    History of panic attacks     PONV (postoperative nausea and vomiting)     Screening for colon cancer     Tubulovillous adenoma        Past Surgical History:  Past Surgical History:   Procedure Laterality Date    APPENDECTOMY N/A 5/9/2025    Procedure: APPENDECTOMY LAPAROSCOPIC;  Surgeon: Loraine Casarez MD;  Location: [facility] MAIN OR;  Service: General;  Laterality: N/A;    CERVICAL BIOPSY  W/ LOOP ELECTRODE EXCISION      COLONOSCOPY  11/27/2018    SANTA gale M.D.    COLONOSCOPY N/A 3/8/2022    Procedure: COLONOSCOPY with Polypectomy;  Surgeon: Leandro Akbar MD;  Location: [facility] [place] MAIN OR;  Service: Gastroenterology;  Laterality: N/A;  Hemorrhoids, Diverticulosis, Polyp    LEEP  2008    UPPER GASTROINTESTINAL ENDOSCOPY  11/27/2018    possible celiac - GIO Akbar     WISDOM TOOTH EXTRACTION         Family History:  Family History   Problem  Relation Age of Onset    Diverticulitis Father     Colon polyps Father     Cancer Father         Pancreatic and skin cancer    Celiac disease Sister         Unsure what condition. Celiac/IBS/Crohns    Crohn's disease Sister     Colon polyps Sister     Colon cancer Maternal Grandmother         Unsure at that time whether she had colon or uterine cancer    Colon cancer Paternal Grandmother     Cirrhosis Paternal Grandmother     Heart disease Mother     Colon polyps Mother     Colon polyps Brother         Early 20’s had a few polyps removed    Colonic polyp Other     Hyperlipidemia Other     Hypertension Other     Cancer Brother         Colon cancer       Social History:  Social History     Socioeconomic History    Marital status:    Tobacco Use    Smoking status: Never    Smokeless tobacco: Never   Vaping Use    Vaping status: Never Used   Substance and Sexual Activity    Alcohol use: Not Currently     Alcohol/week: 1.0 standard drink of alcohol     Types: 1 Glasses of wine per week     Comment: social    Drug use: No    Sexual activity: Yes     Partners: Male     Birth control/protection: Vasectomy       Allergies:  No Known Allergies    Meds:    Current Outpatient Medications:     ascorbic acid (VITAMIN C) 1000 MG tablet, Take 1 tablet by mouth Daily., Disp: , Rfl:     albuterol sulfate  (90 Base) MCG/ACT inhaler, Inhale 2 puffs Every 4 (Four) Hours As Needed. (Patient not taking: Reported on 5/22/2025), Disp: , Rfl:     ferrous sulfate 325 (65 FE) MG tablet, Take 1 tablet by mouth Daily With Breakfast. (Patient not taking: Reported on 5/22/2025), Disp: , Rfl:     FLUoxetine (PROzac) 10 MG capsule, Take 3 capsules by mouth As Needed. (Patient not taking: Reported on 5/22/2025), Disp: , Rfl:     hydrOXYzine (ATARAX) 10 MG tablet, Take 1 tablet by mouth At Night As Needed. (Patient not taking: Reported on 5/22/2025), Disp: , Rfl:     Multiple Vitamins-Minerals (MULTIVITAMIN ADULT PO), Take  by mouth.  "(Patient not taking: Reported on 5/22/2025), Disp: , Rfl:     Omega-3 Fatty Acids (OMEGA 3 PO), Take  by mouth. (Patient not taking: Reported on 5/22/2025), Disp: , Rfl:     polyethylene glycol (MIRALAX) 17 GM/SCOOP powder, Mix one capful (17 g) in liquid and take by mouth Daily for 3 days. (Patient not taking: Reported on 5/22/2025), Disp: 510 g, Rfl: 0    tretinoin (RETIN-A) 0.025 % cream, Apply 1 Application topically to the appropriate area as directed At Night As Needed. (Patient not taking: Reported on 5/22/2025), Disp: , Rfl:       Physical Exam:   /84   Pulse 99   Temp 97.9 °F (36.6 °C) (Oral)   Ht 167.6 cm (65.98\")   Wt 66.8 kg (147 lb 3.2 oz)   LMP 04/24/2025 (Approximate)   SpO2 99%   BMI 23.77 kg/m²   Constitutional: Well-developed well-nourished   Eyes: Conjunctiva normal, sclera nonicteric  HENT: Hearing grossly normal, oral mucosa moist  Respiratory: Normal inspiratory effort  Cardiovascular: Regular rate, no peripheral edema   Gastrointestinal: Abd soft, nontender, nondistended; healing laparoscopic incisions present  : no CVA tenderness  Musculoskeletal: Symmetric strength, normal gait  Psychiatric: Normal mood and affect  Skin:  Warm, dry, no rash on visualized skin surfaces    Assessment/Plan:  49 y.o. female presenting s/p laparoscopic appendectomy for acute appendicitis on 5/9/25    Discussed benign pathology.  Patient is recovering well postoperatively. Her left flank pain is improving slowly, suspect it may be musculoskeletal and recommended continued observation for now. She reports possible rectal bleeding but is unsure of this due to being on her period as well. I offered rectal exam which patient declined, and discussed colonoscopy if she has rectal bleeding that is persistent, given her family history of colon cancer and her history of colon polyps. I will see the patient back in a month for recheck. Patient agreeable.    Loraine Casarez M.D.  General, Robotic and " Endoscopic Surgery  [facility]    [address], [address]  [place], [place], [address]  P: [phone]  F: [phone]

## 2025-06-19 ENCOUNTER — OFFICE VISIT (OUTPATIENT)
Dept: SURGERY | Facility: CLINIC | Age: 50
End: 2025-06-19
Payer: COMMERCIAL

## 2025-06-19 VITALS
HEIGHT: 66 IN | BODY MASS INDEX: 23.24 KG/M2 | WEIGHT: 144.6 LBS | SYSTOLIC BLOOD PRESSURE: 110 MMHG | TEMPERATURE: 97.5 F | HEART RATE: 94 BPM | OXYGEN SATURATION: 98 % | DIASTOLIC BLOOD PRESSURE: 82 MMHG

## 2025-06-19 DIAGNOSIS — Z90.49 STATUS POST LAPAROSCOPIC APPENDECTOMY: Primary | ICD-10-CM

## 2025-06-19 PROCEDURE — 99024 POSTOP FOLLOW-UP VISIT: CPT | Performed by: STUDENT IN AN ORGANIZED HEALTH CARE EDUCATION/TRAINING PROGRAM

## 2025-06-19 NOTE — PROGRESS NOTES
General Surgery Office Follow-up Note    History of Present Illness:   Flora Cruz is a 49-year-old lady who presents today for follow-up s/p laparoscopic appendectomy on 5/9/2025.  Final pathology revealed acute appendicitis, periappendicitis, and serositis with benign peritoneal inclusion cysts and 1 single benign lymph node.  This was discussed at her last visit.  She denies any fever, chills, nausea, vomiting, diarrhea or constipation.  During her last visit, she was on her period and thought she may be having some rectal bleeding.  Today, she feels confident that it was not rectal bleeding, and was due to her period.  She has been very thorough about checking for bleeding from her rectum since then, and reports that she has not had any.  In addition, she did report 1 unrelated episode of rectal pain a few weeks ago after her last postoperative visit, however, she believes that this was due to severe constipation likely caused by pain medication after surgery.  The next day she was able to get her bowels moving, and has not had any issues since.  She is taking magnesium to help with this.  She still reports occasional left flank pain, but this is continuing to improve.    Past Medical History:  Past Medical History:   Diagnosis Date    Anemia 10/2021    Low ferritin    Chest pain     Colon polyp 11/2018    Coronary artery disease 2020    Tachycardia/    Diverticulitis of colon 2018    Diverticulosis    Dysphagia     Epigastric pain     GERD (gastroesophageal reflux disease) 2018    Hernia 2014    Supposed to have surgery 2022    History of panic attacks     PONV (postoperative nausea and vomiting)     Screening for colon cancer     Tubulovillous adenoma      Past Surgical History:  Past Surgical History:   Procedure Laterality Date    APPENDECTOMY N/A 5/9/2025    Procedure: APPENDECTOMY LAPAROSCOPIC;  Surgeon: Loraine Casarez MD;  Location: [facility];  Service: General;  Laterality: N/A;    CERVICAL  BIOPSY  W/ LOOP ELECTRODE EXCISION      COLONOSCOPY  11/27/2018    torts, TVA - Raffy POWERS    COLONOSCOPY N/A 3/8/2022    Procedure: COLONOSCOPY with Polypectomy;  Surgeon: Leandro Akbar MD;  [place] [facility];  Service: Gastroenterology;  Laterality: N/A;  Hemorrhoids, Diverticulosis, Polyp    LEEP  2008    UPPER GASTROINTESTINAL ENDOSCOPY  11/27/2018    possible celiac - GIO Akbar     WISDOM TOOTH EXTRACTION       Family History;  Family History   Problem Relation Age of Onset    Diverticulitis Father     Colon polyps Father     Cancer Father         Pancreatic and skin cancer    Celiac disease Sister         Unsure what condition. Celiac/IBS/Crohns    Crohn's disease Sister     Colon polyps Sister     Colon cancer Maternal Grandmother         Unsure at that time whether she had colon or uterine cancer    Colon cancer Paternal Grandmother     Cirrhosis Paternal Grandmother     Heart disease Mother     Colon polyps Mother     Colon polyps Brother         Early 20’s had a few polyps removed    Colonic polyp Other     Hyperlipidemia Other     Hypertension Other     Cancer Brother         Colon cancer     Social History:  Social History     Socioeconomic History    Marital status:    Tobacco Use    Smoking status: Never    Smokeless tobacco: Never   Vaping Use    Vaping status: Never Used   Substance and Sexual Activity    Alcohol use: Not Currently     Alcohol/week: 1.0 standard drink of alcohol     Types: 1 Glasses of wine per week     Comment: social    Drug use: No    Sexual activity: Yes     Partners: Male     Birth control/protection: Vasectomy     Allergies:  No Known Allergies    Meds:    Current Outpatient Medications:     albuterol sulfate  (90 Base) MCG/ACT inhaler, Inhale 2 puffs Every 4 (Four) Hours As Needed., Disp: , Rfl:     ascorbic acid (VITAMIN C) 1000 MG tablet, Take 1 tablet by mouth Daily., Disp: , Rfl:     tretinoin (RETIN-A) 0.025 % cream, Apply 1 Application  "topically to the appropriate area as directed At Night As Needed., Disp: , Rfl:     ferrous sulfate 325 (65 FE) MG tablet, Take 1 tablet by mouth Daily With Breakfast. (Patient not taking: Reported on 6/19/2025), Disp: , Rfl:     FLUoxetine (PROzac) 10 MG capsule, Take 3 capsules by mouth As Needed. (Patient not taking: Reported on 6/19/2025), Disp: , Rfl:     hydrOXYzine (ATARAX) 10 MG tablet, Take 1 tablet by mouth At Night As Needed. (Patient not taking: Reported on 6/19/2025), Disp: , Rfl:     Multiple Vitamins-Minerals (MULTIVITAMIN ADULT PO), Take  by mouth. (Patient not taking: Reported on 6/19/2025), Disp: , Rfl:     Omega-3 Fatty Acids (OMEGA 3 PO), Take  by mouth. (Patient not taking: Reported on 6/19/2025), Disp: , Rfl:     Physical Exam:   /82  Pulse 94  Temp 97.5 °F (36.4 °C) (Oral)  Ht 167.6 cm (65.98\")  Wt 65.6 kg (144 lb 9.6 oz)  SpO2 98%  BMI 23.35 kg/m²   Constitutional: Well-developed well-nourished   Eyes: Conjunctiva normal, sclera nonicteric  HENT: Hearing grossly normal, oral mucosa moist  Respiratory: Normal inspiratory effort  Cardiovascular: Regular rate, no peripheral edema   Gastrointestinal: Abd soft, nontender, nondistended; laparoscopic incisions healed with minimal expected swelling over the extraction site  : no CVA tenderness  Musculoskeletal: Symmetric strength, normal gait  Psychiatric: Normal mood and affect  Skin:  Warm, dry, no rash on visualized skin surfaces    Assessment/Plan:  This is a pleasant 49-year-old lady presenting s/p laparoscopic appendectomy for acute appendicitis on 5/9/2025.    She is continuing to recover well postoperatively.  Her left flank pain is continuing to improve.  Recommend continued observation.  While she did seem confident that the possible rectal bleeding she experienced was actually due to her period, indications for colonoscopy were discussed. She should call the office and follow-up if she experiences any of the following: " persistent rectal bleeding, new onset abdominal pain, unintentional weight loss, worsening nausea/vomiting, and change in bowel habits.  Her last colonoscopy was performed in 2022 with Dr. Akbar.  This revealed a 5 mm tubular adenoma.  Follow-up screening colonoscopy in 3 to 5 years was recommended at that time, and remains a reasonable plan of care due to her extensive family history.  However, should she develop any symptoms as discussed above, she should return for further evaluation.  All questions were answered, and patient was willing to proceed with this plan of care.  She may follow-up with General Surgery as needed.      Loraine Casarez M.D.  General, Robotic and Endoscopic Surgery  [facility]     [address], [address]  [place], [place], [address]  P: [phone]  F: [phone]

## (undated) DEVICE — SINGLE-USE POLYPECTOMY SNARE: Brand: SENSATION SHORT THROW

## (undated) DEVICE — LOU LAP CHOLE: Brand: MEDLINE INDUSTRIES, INC.

## (undated) DEVICE — ENDOPATH XCEL BLADELESS TROCARS WITH STABILITY SLEEVES: Brand: ENDOPATH XCEL

## (undated) DEVICE — ENDOPATH XCEL UNIVERSAL TROCAR STABLILITY SLEEVES: Brand: ENDOPATH XCEL

## (undated) DEVICE — SINGLE-USE BIOPSY FORCEPS: Brand: RADIAL JAW 4

## (undated) DEVICE — THE DEVICE IS A DISPOSABLE, LIGATURE PASSING, SUTURING APPARATUS AND NEEDLE GUIDE FOR THE ABDOMINAL WALL WHICH IS NON-POWERED, HAND-HELD, AND HAND-MANIPULATED,INTENDED TO BE USED IN VARIOUS GENERAL SURGICAL PROCEDURES. THE DEVICE INCLUDES A LIGATURE CARRIER PATHWAY, NEEDLE GUIDE, TWO NEEDLES, REFERENCE PLANE T-BAR, AND A GUIDEWIRE. THE HANDLE OF THE DEVICE PROVIDES TWO DIAMETRICALLY OPPOSED ENCLOSED GUIDEWAYS FOR THE ADVANCEMENT AND RETRACTION OF THE NEEDLES UNDER MANUAL CONTROL OF A PLUNGER LOCATED AT THE PROXIMAL END OF THE DEVICE.AS PART OF THE M-CLOSE CONVENIENCE KIT, A NERVE BLOCK NEEDLE IS INCLUDED FOR THE ADMINISTRATION OF LOCAL ANESTHETIC AGENTS TO PROVIDE REGIONAL AND LOCAL ANESTHESIA.  A TELFA ANTIMICROBIAL, NON-ADHERENT PAD IS ALSO PROVIDED IN THE KIT FOR USE AS A PRIMARY DRESSING FOR THE SURGICAL INCISION.: Brand: M-CLOSE KIT

## (undated) DEVICE — Device

## (undated) DEVICE — APPL CHLORAPREP HI/LITE 26ML ORNG

## (undated) DEVICE — GOWN ,SIRUS,NONREINFORCED 3XL: Brand: MEDLINE

## (undated) DEVICE — KT ORCA ORCAPOD DISP STRL

## (undated) DEVICE — MONOPOLAR METZENBAUM SCISSOR TIP, DISPOSABLE: Brand: MONOPOLAR METZENBAUM SCISSOR TIP, DISPOSABLE

## (undated) DEVICE — INTENDED FOR TISSUE SEPARATION, AND OTHER PROCEDURES THAT REQUIRE A SHARP SURGICAL BLADE TO PUNCTURE OR CUT.: Brand: BARD-PARKER ® CARBON RIB-BACK BLADES

## (undated) DEVICE — HARMONIC 700 SHEARS, ADVANCED HEMOSTASIS: Brand: HARMONIC

## (undated) DEVICE — UNDRGLV SURG BIOGEL PUNCTUREINDICATION SZ7 PF STRL

## (undated) DEVICE — CANN NASL CO2 TRULINK W/O2 A/

## (undated) DEVICE — INSUFFLATION TUBING SET, WITH GAS FILTER: Brand: N.A.

## (undated) DEVICE — DISPOSABLE MONOPOLAR ENDOSCOPIC CORD 10 FT. (3M): Brand: KIRWAN

## (undated) DEVICE — SUT MNCRYL PLS ANTIB UD 4/0 PS2 18IN

## (undated) DEVICE — SYR LL TP 10ML STRL

## (undated) DEVICE — ENDOCUT SCISSOR TIP, DISPOSABLE: Brand: RENEW

## (undated) DEVICE — LAPAROSCOPIC SMOKE FILTRATION SYSTEM: Brand: PALL LAPAROSHIELD® PLUS LAPAROSCOPIC SMOKE FILTRATION SYSTEM

## (undated) DEVICE — ENDOPOUCH RETRIEVER SPECIMEN RETRIEVAL BAGS: Brand: ENDOPOUCH RETRIEVER

## (undated) DEVICE — GLV SURG BIOGEL M LTX PF 6 1/2

## (undated) DEVICE — GOWN ISOL W/THUMB UNIV BLU BX/15

## (undated) DEVICE — LAPAROVUE VISIBILITY SYSTEM LAPAROSCOPIC SOLUTIONS: Brand: LAPAROVUE

## (undated) DEVICE — FLEX ADVANTAGE 1500CC: Brand: FLEX ADVANTAGE

## (undated) DEVICE — 1LYRTR 16FR10ML100%SIL UMS SNP: Brand: MEDLINE INDUSTRIES, INC.

## (undated) DEVICE — ECHELON FLEX45 ENDOPATH STAPLER, ARTICULATING ENDOSCOPIC LINEAR CUTTER (NO CARTRIDGE): Brand: ECHELON ENDOPATH

## (undated) DEVICE — EXOFIN PRECISION PEN HIGH VISCOSITY TOPICAL SKIN ADHESIVE: Brand: EXOFIN PRECISION PEN, 1G

## (undated) DEVICE — SYR LUERLOK 20CC BX/50

## (undated) DEVICE — SUT VIC 0 UR6 27IN VCP603H

## (undated) DEVICE — DISPOSABLE GRASPER CARTRIDGE: Brand: DIRECT DRIVE REPOSABLE GRASPERS